# Patient Record
Sex: FEMALE | Race: WHITE | HISPANIC OR LATINO
[De-identification: names, ages, dates, MRNs, and addresses within clinical notes are randomized per-mention and may not be internally consistent; named-entity substitution may affect disease eponyms.]

---

## 2020-10-01 ENCOUNTER — APPOINTMENT (OUTPATIENT)
Dept: OBGYN | Facility: CLINIC | Age: 30
End: 2020-10-01
Payer: COMMERCIAL

## 2020-10-01 VITALS
HEIGHT: 62.5 IN | TEMPERATURE: 98.5 F | BODY MASS INDEX: 24.94 KG/M2 | WEIGHT: 139 LBS | DIASTOLIC BLOOD PRESSURE: 88 MMHG | SYSTOLIC BLOOD PRESSURE: 123 MMHG

## 2020-10-01 DIAGNOSIS — Z31.62 ENCOUNTER FOR FERTILITY PRESERVATION COUNSELING: ICD-10-CM

## 2020-10-01 DIAGNOSIS — D22.9 MELANOCYTIC NEVI, UNSPECIFIED: ICD-10-CM

## 2020-10-01 DIAGNOSIS — Z78.9 OTHER SPECIFIED HEALTH STATUS: ICD-10-CM

## 2020-10-01 DIAGNOSIS — Z11.3 ENCOUNTER FOR SCREENING FOR INFECTIONS WITH A PREDOMINANTLY SEXUAL MODE OF TRANSMISSION: ICD-10-CM

## 2020-10-01 PROCEDURE — 99203 OFFICE O/P NEW LOW 30 MIN: CPT

## 2020-10-01 NOTE — REVIEW OF SYSTEMS
[Fever] : no fever [Chills] : no chills [Fatigue] : no fatigue [Poor Appetite] : appetite not poor [Night Sweats] : no night sweats [Recent Weight Gain (___ Lbs)] : no recent weight gain [Recent Weight Loss (___ Lbs)] : no recent weight loss [Dry Eyes] : no dry eyes [Eye Discharge] : no eye discharge [Sight Problems] : no sight problems [Eye Redness] : no eye redness [Dec Hearing] : no decreased hearing [Nasal Discharge] : no nasal discharge [Sore Throat] : no sore throat [Dyspnea] : no dyspnea [Cough] : no cough [SOB on Exertion] : no shortness of breath on exertion [Wheezing] : no wheezing [Chest Pain] : no chest pain [Palpitations] : no palpitations [Lower Ext Edema] : no lower extremity edema [Orthopnea] : no orthopnea [Abdominal Pain] : no abdominal pain [Constipation] : no constipation [Diarrhea] : diarrhea [Heartburn] : no heartburn [Vomiting] : no vomiting [Melena] : no melena [Bloating] : no bloating [Nausea] : no nausea [Urgency] : no urgency [Frequency] : no frequency [Incontinence] : no incontinence [Dysuria] : no dysuria [Urethral Discharge] : no urethral discharge [Abn Vaginal bleeding] : no abnormal vaginal bleeding [Pelvic pain] : no pelvic pain [CVA Pain] : no CVA pain [Genital Rash/Irritation] : no genital rash/irritation [Breast Pain] : no breast pain [Breast Lump] : no breast lump [Nipple Changes] : no nipple changes [Nipple Discharge] : no nipple discharge [Skin Lesion on Breast] : no skin lesion on breast [Skin Rash] : no skin rash [Mole Changes] : no mole changes [Arthralgias] : no arthralgias [Myalgias] : no myalgias [Joint Stiffness] : no joint stiffness [Back Pain] : no back pain [Headache] : no headache [Dizziness] : no dizziness [Fainting] : no fainting [Convulsions] : no convulsions [Anxiety] : no anxiety [Depression] : no depression [Sleep Disturbances] : no sleep disturbances [Decreased Libido] : libido not decreased [PMS/PMDD Symptoms] : no PMS/PMDD symptoms [Deepening Voice] : no deepening voice [Feeling Weak] : not feeling weak [Hot Flashes] : no hot flashes [Muscle Weakness] : no muscle weakness [Easy Bleeding] : no easy bleeding [Easy Bruising] : no easy bruising [Nose Bleeds] : no nose bleeds

## 2020-10-01 NOTE — HISTORY OF PRESENT ILLNESS
[FreeTextEntry1] : 30yo G0 LMP "week of 9/14//20" presents for annual exam\par Has no complaints\par Interested in egg freezing\par In good spirits\par Was walking during pandemic\par Starting a kick boxing boot camp this week\par

## 2020-10-06 LAB
C TRACH RRNA SPEC QL NAA+PROBE: NOT DETECTED
HBV SURFACE AG SER QL: NONREACTIVE
HIV1+2 AB SPEC QL IA.RAPID: NONREACTIVE
HPV HIGH+LOW RISK DNA PNL CVX: NOT DETECTED
N GONORRHOEA RRNA SPEC QL NAA+PROBE: NOT DETECTED
SOURCE AMPLIFICATION: NORMAL
T PALLIDUM AB SER QL IA: NEGATIVE

## 2020-10-07 LAB — CYTOLOGY CVX/VAG DOC THIN PREP: ABNORMAL

## 2020-11-03 ENCOUNTER — APPOINTMENT (OUTPATIENT)
Dept: DERMATOLOGY | Facility: CLINIC | Age: 30
End: 2020-11-03
Payer: COMMERCIAL

## 2020-11-03 VITALS — BODY MASS INDEX: 24.94 KG/M2 | WEIGHT: 139 LBS | HEIGHT: 62.5 IN

## 2020-11-03 DIAGNOSIS — Z13.89 ENCOUNTER FOR SCREENING FOR OTHER DISORDER: ICD-10-CM

## 2020-11-03 DIAGNOSIS — L81.4 OTHER MELANIN HYPERPIGMENTATION: ICD-10-CM

## 2020-11-03 DIAGNOSIS — D22.9 MELANOCYTIC NEVI, UNSPECIFIED: ICD-10-CM

## 2020-11-03 PROCEDURE — 99072 ADDL SUPL MATRL&STAF TM PHE: CPT

## 2020-11-03 PROCEDURE — 99243 OFF/OP CNSLTJ NEW/EST LOW 30: CPT

## 2020-11-03 NOTE — HISTORY OF PRESENT ILLNESS
[FreeTextEntry1] : skin check [de-identified] : 29 year old female here for skin check. no tx tried. was recommended to get skin check.

## 2020-11-06 RX ORDER — NORETHINDRONE ACETATE AND ETHINYL ESTRADIOL, ETHINYL ESTRADIOL AND FERROUS FUMARATE 1MG-10(24)
1 MG-10 MCG / KIT ORAL
Qty: 3 | Refills: 3 | Status: ACTIVE | COMMUNITY
Start: 1900-01-01 | End: 1900-01-01

## 2020-11-10 ENCOUNTER — APPOINTMENT (OUTPATIENT)
Dept: HUMAN REPRODUCTION | Facility: CLINIC | Age: 30
End: 2020-11-10

## 2020-12-01 ENCOUNTER — APPOINTMENT (OUTPATIENT)
Dept: DERMATOLOGY | Facility: CLINIC | Age: 30
End: 2020-12-01

## 2021-07-13 ENCOUNTER — TRANSCRIPTION ENCOUNTER (OUTPATIENT)
Age: 31
End: 2021-07-13

## 2021-08-12 ENCOUNTER — OUTPATIENT (OUTPATIENT)
Dept: OUTPATIENT SERVICES | Facility: HOSPITAL | Age: 31
LOS: 1 days | End: 2021-08-12
Payer: COMMERCIAL

## 2021-08-12 ENCOUNTER — TRANSCRIPTION ENCOUNTER (OUTPATIENT)
Age: 31
End: 2021-08-12

## 2021-08-12 ENCOUNTER — APPOINTMENT (OUTPATIENT)
Dept: RADIOLOGY | Facility: CLINIC | Age: 31
End: 2021-08-12
Payer: COMMERCIAL

## 2021-08-12 DIAGNOSIS — J45.21 MILD INTERMITTENT ASTHMA WITH (ACUTE) EXACERBATION: ICD-10-CM

## 2021-08-12 PROCEDURE — 71046 X-RAY EXAM CHEST 2 VIEWS: CPT

## 2021-08-12 PROCEDURE — 71046 X-RAY EXAM CHEST 2 VIEWS: CPT | Mod: 26

## 2021-12-08 NOTE — CONSULT LETTER
[Dear  ___] : Dear  [unfilled], [Consult Letter:] : I had the pleasure of evaluating your patient, [unfilled]. [Please see my note below.] : Please see my note below. [Consult Closing:] : Thank you very much for allowing me to participate in the care of this patient.  If you have any questions, please do not hesitate to contact me. [Sincerely,] : Sincerely, [FreeTextEntry3] : Best,\par \par Arturo Hood MD, FAAD\par Department of Dermatology [facility]\par \par  Hydroxyzine Counseling: Patient advised that the medication is sedating and not to drive a car after taking this medication.  Patient informed of potential adverse effects including but not limited to dry mouth, urinary retention, and blurry vision.  The patient verbalized understanding of the proper use and possible adverse effects of hydroxyzine.  All of the patient's questions and concerns were addressed.

## 2022-08-31 ENCOUNTER — NON-APPOINTMENT (OUTPATIENT)
Age: 32
End: 2022-08-31

## 2022-09-24 ENCOUNTER — NON-APPOINTMENT (OUTPATIENT)
Age: 32
End: 2022-09-24

## 2023-01-07 ENCOUNTER — INPATIENT (INPATIENT)
Facility: HOSPITAL | Age: 33
LOS: 1 days | Discharge: HOME | DRG: 897 | End: 2023-01-09
Attending: INTERNAL MEDICINE | Admitting: INTERNAL MEDICINE
Payer: MEDICAID

## 2023-01-07 VITALS
WEIGHT: 126.1 LBS | HEART RATE: 105 BPM | DIASTOLIC BLOOD PRESSURE: 91 MMHG | OXYGEN SATURATION: 95 % | TEMPERATURE: 98 F | HEIGHT: 62 IN | SYSTOLIC BLOOD PRESSURE: 153 MMHG | RESPIRATION RATE: 20 BRPM

## 2023-01-07 DIAGNOSIS — F10.239 ALCOHOL DEPENDENCE WITH WITHDRAWAL, UNSPECIFIED: ICD-10-CM

## 2023-01-07 LAB
ALBUMIN SERPL ELPH-MCNC: 5.2 G/DL — HIGH (ref 3.3–5)
ALP SERPL-CCNC: 112 U/L — SIGNIFICANT CHANGE UP (ref 40–120)
ALT FLD-CCNC: 55 U/L — HIGH (ref 10–45)
AMPHET UR-MCNC: NEGATIVE — SIGNIFICANT CHANGE UP
ANION GAP SERPL CALC-SCNC: 22 MMOL/L — HIGH (ref 5–17)
APAP SERPL-MCNC: <15 UG/ML — SIGNIFICANT CHANGE UP (ref 10–30)
APPEARANCE UR: CLEAR — SIGNIFICANT CHANGE UP
AST SERPL-CCNC: 47 U/L — HIGH (ref 10–40)
BACTERIA # UR AUTO: NEGATIVE — SIGNIFICANT CHANGE UP
BARBITURATES UR SCN-MCNC: NEGATIVE — SIGNIFICANT CHANGE UP
BASOPHILS # BLD AUTO: 0.01 K/UL — SIGNIFICANT CHANGE UP (ref 0–0.2)
BASOPHILS NFR BLD AUTO: 0.2 % — SIGNIFICANT CHANGE UP (ref 0–2)
BENZODIAZ UR-MCNC: NEGATIVE — SIGNIFICANT CHANGE UP
BILIRUB SERPL-MCNC: 1 MG/DL — SIGNIFICANT CHANGE UP (ref 0.2–1.2)
BILIRUB UR-MCNC: NEGATIVE — SIGNIFICANT CHANGE UP
BUN SERPL-MCNC: 20 MG/DL — SIGNIFICANT CHANGE UP (ref 7–23)
CALCIUM SERPL-MCNC: 10.4 MG/DL — SIGNIFICANT CHANGE UP (ref 8.4–10.5)
CHLORIDE SERPL-SCNC: 85 MMOL/L — LOW (ref 96–108)
CO2 SERPL-SCNC: 25 MMOL/L — SIGNIFICANT CHANGE UP (ref 22–31)
COCAINE METAB.OTHER UR-MCNC: NEGATIVE — SIGNIFICANT CHANGE UP
COLOR SPEC: SIGNIFICANT CHANGE UP
CREAT SERPL-MCNC: 1.2 MG/DL — SIGNIFICANT CHANGE UP (ref 0.5–1.3)
DIFF PNL FLD: ABNORMAL
EGFR: 61 ML/MIN/1.73M2 — SIGNIFICANT CHANGE UP
EOSINOPHIL # BLD AUTO: 0 K/UL — SIGNIFICANT CHANGE UP (ref 0–0.5)
EOSINOPHIL NFR BLD AUTO: 0 % — SIGNIFICANT CHANGE UP (ref 0–6)
EPI CELLS # UR: 2 /HPF — SIGNIFICANT CHANGE UP
ETHANOL SERPL-MCNC: <10 MG/DL — SIGNIFICANT CHANGE UP (ref 0–10)
GLUCOSE SERPL-MCNC: 203 MG/DL — HIGH (ref 70–99)
GLUCOSE UR QL: ABNORMAL
HCG SERPL-ACNC: <2 MIU/ML — SIGNIFICANT CHANGE UP
HCT VFR BLD CALC: 38.1 % — SIGNIFICANT CHANGE UP (ref 34.5–45)
HGB BLD-MCNC: 13.8 G/DL — SIGNIFICANT CHANGE UP (ref 11.5–15.5)
HYALINE CASTS # UR AUTO: 2 /LPF — SIGNIFICANT CHANGE UP (ref 0–2)
IMM GRANULOCYTES NFR BLD AUTO: 0.5 % — SIGNIFICANT CHANGE UP (ref 0–0.9)
KETONES UR-MCNC: ABNORMAL
LEUKOCYTE ESTERASE UR-ACNC: NEGATIVE — SIGNIFICANT CHANGE UP
LYMPHOCYTES # BLD AUTO: 0.52 K/UL — LOW (ref 1–3.3)
LYMPHOCYTES # BLD AUTO: 8.5 % — LOW (ref 13–44)
MCHC RBC-ENTMCNC: 32.3 PG — SIGNIFICANT CHANGE UP (ref 27–34)
MCHC RBC-ENTMCNC: 36.2 GM/DL — HIGH (ref 32–36)
MCV RBC AUTO: 89.2 FL — SIGNIFICANT CHANGE UP (ref 80–100)
METHADONE UR-MCNC: NEGATIVE — SIGNIFICANT CHANGE UP
MONOCYTES # BLD AUTO: 0.41 K/UL — SIGNIFICANT CHANGE UP (ref 0–0.9)
MONOCYTES NFR BLD AUTO: 6.7 % — SIGNIFICANT CHANGE UP (ref 2–14)
NEUTROPHILS # BLD AUTO: 5.16 K/UL — SIGNIFICANT CHANGE UP (ref 1.8–7.4)
NEUTROPHILS NFR BLD AUTO: 84.1 % — HIGH (ref 43–77)
NITRITE UR-MCNC: NEGATIVE — SIGNIFICANT CHANGE UP
NRBC # BLD: 0 /100 WBCS — SIGNIFICANT CHANGE UP (ref 0–0)
OPIATES UR-MCNC: NEGATIVE — SIGNIFICANT CHANGE UP
OXYCODONE UR-MCNC: NEGATIVE — SIGNIFICANT CHANGE UP
PCP SPEC-MCNC: SIGNIFICANT CHANGE UP
PCP UR-MCNC: NEGATIVE — SIGNIFICANT CHANGE UP
PH UR: 5.5 — SIGNIFICANT CHANGE UP (ref 5–8)
PLATELET # BLD AUTO: 73 K/UL — LOW (ref 150–400)
POTASSIUM SERPL-MCNC: 2.7 MMOL/L — CRITICAL LOW (ref 3.5–5.3)
POTASSIUM SERPL-SCNC: 2.7 MMOL/L — CRITICAL LOW (ref 3.5–5.3)
PROT SERPL-MCNC: 8.9 G/DL — HIGH (ref 6–8.3)
PROT UR-MCNC: ABNORMAL
RBC # BLD: 4.27 M/UL — SIGNIFICANT CHANGE UP (ref 3.8–5.2)
RBC # FLD: 11.4 % — SIGNIFICANT CHANGE UP (ref 10.3–14.5)
RBC CASTS # UR COMP ASSIST: 70 /HPF — HIGH (ref 0–4)
SALICYLATES SERPL-MCNC: <2 MG/DL — LOW (ref 15–30)
SARS-COV-2 RNA SPEC QL NAA+PROBE: SIGNIFICANT CHANGE UP
SODIUM SERPL-SCNC: 132 MMOL/L — LOW (ref 135–145)
SP GR SPEC: 1.01 — LOW (ref 1.01–1.02)
THC UR QL: NEGATIVE — SIGNIFICANT CHANGE UP
UROBILINOGEN FLD QL: NEGATIVE — SIGNIFICANT CHANGE UP
WBC # BLD: 6.13 K/UL — SIGNIFICANT CHANGE UP (ref 3.8–10.5)
WBC # FLD AUTO: 6.13 K/UL — SIGNIFICANT CHANGE UP (ref 3.8–10.5)
WBC UR QL: 2 /HPF — SIGNIFICANT CHANGE UP (ref 0–5)

## 2023-01-07 PROCEDURE — 99285 EMERGENCY DEPT VISIT HI MDM: CPT

## 2023-01-07 RX ORDER — POTASSIUM CHLORIDE 20 MEQ
40 PACKET (EA) ORAL ONCE
Refills: 0 | Status: COMPLETED | OUTPATIENT
Start: 2023-01-07 | End: 2023-01-07

## 2023-01-07 RX ORDER — SODIUM CHLORIDE 9 MG/ML
1000 INJECTION INTRAMUSCULAR; INTRAVENOUS; SUBCUTANEOUS
Refills: 0 | Status: DISCONTINUED | OUTPATIENT
Start: 2023-01-07 | End: 2023-01-09

## 2023-01-07 RX ORDER — POTASSIUM CHLORIDE 20 MEQ
10 PACKET (EA) ORAL
Refills: 0 | Status: COMPLETED | OUTPATIENT
Start: 2023-01-07 | End: 2023-01-07

## 2023-01-07 RX ORDER — POTASSIUM CHLORIDE 20 MEQ
40 PACKET (EA) ORAL ONCE
Refills: 0 | Status: DISCONTINUED | OUTPATIENT
Start: 2023-01-07 | End: 2023-01-07

## 2023-01-07 RX ADMIN — Medication 1 MILLIGRAM(S): at 19:45

## 2023-01-07 RX ADMIN — Medication 50 MILLIGRAM(S): at 19:12

## 2023-01-07 RX ADMIN — SODIUM CHLORIDE 100 MILLILITER(S): 9 INJECTION INTRAMUSCULAR; INTRAVENOUS; SUBCUTANEOUS at 20:39

## 2023-01-07 RX ADMIN — Medication 40 MILLIEQUIVALENT(S): at 20:14

## 2023-01-07 RX ADMIN — Medication 100 MILLIEQUIVALENT(S): at 21:47

## 2023-01-07 RX ADMIN — Medication 100 MILLIEQUIVALENT(S): at 20:13

## 2023-01-07 NOTE — ED PROVIDER NOTE - OBJECTIVE STATEMENT
Patient is a 32-year-old female with a past medical history of daily alcohol abuse prior cocaine use anxiety presents to the ER today for anxiety.  Accompanied by cousin at bedside who states that patient was claiming that her neighbors were reporting her.  Patient has also been disclosing to family she that she is hearing voices.  Has not had any alcoholic beverages in the past 4 days.  No history of alcohol withdrawal seizures NICU admissions or intubations in the past.  Denies SI HI.  No prior psychiatric hospitalizations.  Denies any active drug use.

## 2023-01-07 NOTE — H&P ADULT - NSHPLABSRESULTS_GEN_ALL_CORE
13.8   6.13  )-----------( 73       ( 07 Jan 2023 18:44 )             38.1   01-07    132<L>  |  85<L>  |  20  ----------------------------<  203<H>  2.7<LL>   |  25  |  1.20    Ca    10.4      07 Jan 2023 18:44    TPro  8.9<H>  /  Alb  5.2<H>  /  TBili  1.0  /  DBili  x   /  AST  47<H>  /  ALT  55<H>  /  AlkPhos  112  01-07

## 2023-01-07 NOTE — ED PROVIDER NOTE - PHYSICAL EXAMINATION
General: Patient awake alert NAD. Tongue fasciculations and BL hand tremulous   HEENT: normocephalic, atraumatic, EOMI, MMM   Cardiac: RRR, S1, S2, no murmur.   LUNGS: + crackles in b/l bases, no wheeze, rhonchi, speaking full sentences.     Abdomen: soft NT, ND, no rebound no guarding.   EXT: Moving all extremities, no edema.   Neuro: A&Ox3, no focal neurological deficits, CN 2-12 grossly intact.   Skin: warm, dry, no rash.  Psych: anxious

## 2023-01-07 NOTE — ED ADULT NURSE NOTE - OBJECTIVE STATEMENT
32 year old female BIB cousin for psych eval; A&O; denies ABDULKADIR; denies current AVH "I heard voices last night"; denies drug use, states stopped drinking alcohol four days ago; denies Axis III. This is an anxious appearing but cooperative pt, compliant with wanding and property taken, CO begun. Pt was accompanied by her cousin who states that pt lives with her parents and has been hearing things, as per triage pt tried to elope prior to coming back; pt states that she has had no prior psych HX and does not take any meds, she states that she is very anxious and has been "very stressed because I am unemployed and trying to find a job" and consequently she has been drinking heavily "Continuously since early December until January 3rd" and while she denies any seizure history states she has been very tremulous; she responded well to empathetic support; continue to monitor.

## 2023-01-07 NOTE — H&P ADULT - ASSESSMENT
A/P     Hypokalemia :  replace K     ETOH intoxication / AMS/ metabolic encephalopathy/ Hallucination   -likely 2/2 ETOH abuse   watch for DT's  started on CIWA protocol     psych consult in am  A/P     Hypokalemia :  replace K     ETOH withdrawal / Hallucination / anxiety d/o  -likely 2/2 ETOH abuse   watch for DT's  started on CIWA protocol     psych consult in am

## 2023-01-07 NOTE — ED PROVIDER NOTE - PROGRESS NOTE DETAILS
Kranthi, PGY-3  Patient signed out to me. 32-year-old female with a past medical history of daily alcohol abuse prior cocaine use anxiety presents to the ER today for anxiety/auditory hallucinations with last drink on Tuesday. Reassessed patient who is tremulous, anxious, tongue fasciculations, CIWA 8-10. Given 50mg PO librium prior to signout. Will give an additional 1mg IV ativan and monitor on CIWA. Spoke with patient who describes less hallucination and more so harsh sounds/ringing/and sensitivity sound; more suspicious for withdrawal symptoms than psychiatric. Will require medicine admission and can see psych inpatient if still displaying symptoms. Kranthi, PGY-3  Hypokalemic to 2.7. PO/IV repletion ordered. Admitted to Dr. Mejia

## 2023-01-07 NOTE — ED PROVIDER NOTE - CLINICAL SUMMARY MEDICAL DECISION MAKING FREE TEXT BOX
Patient is a 32-year-old female with a past medical history of daily alcohol abuse prior cocaine use anxiety presents to the ER today for anxiety.  Differential includes anxiety vs PRADIP vs thyroid dysfunx. Plan to obtain labs, ecg, psych c/s, reassess

## 2023-01-07 NOTE — ED ADULT NURSE REASSESSMENT NOTE - NS ED NURSE REASSESS COMMENT FT1
Pt belongings were secured. As per MD Crisostomo, pt is not psych and is being teated for alcohol withdrawal. Pt phone was not secured with belongings; MD Crisostomo is aware and okay with patient having cell phone.

## 2023-01-07 NOTE — H&P ADULT - NSHPPHYSICALEXAM_GEN_ALL_CORE
Vital Signs Last 24 Hrs  T(C): 36.9 (07 Jan 2023 17:44), Max: 36.9 (07 Jan 2023 17:44)  T(F): 98.4 (07 Jan 2023 17:44), Max: 98.4 (07 Jan 2023 17:44)  HR: 105 (07 Jan 2023 17:44) (105 - 105)  BP: 153/91 (07 Jan 2023 17:44) (153/91 - 153/91)  BP(mean): --  RR: 20 (07 Jan 2023 17:44) (20 - 20)  SpO2: 95% (07 Jan 2023 17:44) (95% - 95%)    Parameters below as of 07 Jan 2023 17:44  Patient On (Oxygen Delivery Method): room air pt. seen and examined, mother bedside     Vital Signs Last 24 Hrs  T(C): 36.9 (07 Jan 2023 17:44), Max: 36.9 (07 Jan 2023 17:44)  T(F): 98.4 (07 Jan 2023 17:44), Max: 98.4 (07 Jan 2023 17:44)  HR: 105 (07 Jan 2023 17:44) (105 - 105)  BP: 153/91 (07 Jan 2023 17:44) (153/91 - 153/91)  BP(mean): --  RR: 20 (07 Jan 2023 17:44) (20 - 20)  SpO2: 95% (07 Jan 2023 17:44) (95% - 95%)    Heent : nc/at , no pallor   neck : supple, no JVD  Lungs : B/L clear , no w/r/r  heart: s1s2 nml  abd : soft, NABS, NT/ND  ext : no e/c/c, pulses 2 +  neuro: aaox3 , anxious     Parameters below as of 07 Jan 2023 17:44  Patient On (Oxygen Delivery Method): room air

## 2023-01-07 NOTE — H&P ADULT - BIRTH SEX
Pt requested a call back re pt stated he spoke with [facility] and they are saying they did not receive the Rx for Tradjenta 5 MG tablet on 11/4/21. Pt is requesting it be resent and would like a call back when this has been done as he has been with out medication. Pt can be reached at   Telephone Information:   Mobile [phone]          Female

## 2023-01-07 NOTE — ED PROVIDER NOTE - ATTENDING CONTRIBUTION TO CARE
Patient is a 31 yo F with a history of alcohol abuse here, prior cocaine use brought in for evaluation of anxiety and paranoia. I interviewed patient but additional history was provided to Dr. Greenberg and Dr. Crisostomo. Per patient, she drinks about 1 bottle of wine daily, stopped drinking on Tuesday (5 days ago). Last drink was on Tuesday. She reports headache and vomiting on Wednesday, followed by hearing sounds from Thursday onwards. She states she thought her parents were fighting but only she was hearing things. She describes her neighbors taping her and an intervention done by her family and posting it on the internet. She states the wifi was turned off but they could still tape and hear everything. Patient denies any drug use to me. She denies prior psych history. Mother at bedside denies any prior psych history. Patient states she felt she was having a panic attack because her neighbors were listing and taping things and posting it online. She denies any chest pain, abdominal pain, fevers. She states she has not been sleeping, only getting about 1 hour of sleep at night and has racing thoughts. Denies any suicidal ideations.     VS noted  Gen. no acute distress, Non toxic   HEENT: EOMI, mmm, tongue fasciculations  Lungs: CTAB/L no C/ W /R   CVS: tachycardic  Abd; Soft non tender, non distended   Ext: no edema  Skin: no rash  Neuro AAOx3 non focal clear speech  a/p: ETOH withdrawal, concern for psychosis. Plan to treat ETOH withdrawal with librium. cbc, cmp, salicylate, acetaminophen and alcohol levels were done. Of note, K was found to be 2.7. Patient is not medically cleared. She is having ETOH withdrawal symptoms and is hypokalemic. Plan to treat based on CIWA score and treat hypokalemia. EKG ordered. Patient will need psych assessment after she is medically treated. TBA to tele medicine.   - Taina KERR

## 2023-01-08 LAB
ALBUMIN SERPL ELPH-MCNC: 4.3 G/DL — SIGNIFICANT CHANGE UP (ref 3.3–5)
ALP SERPL-CCNC: 81 U/L — SIGNIFICANT CHANGE UP (ref 40–120)
ALT FLD-CCNC: 46 U/L — HIGH (ref 10–45)
ANION GAP SERPL CALC-SCNC: 12 MMOL/L — SIGNIFICANT CHANGE UP (ref 5–17)
ANION GAP SERPL CALC-SCNC: 12 MMOL/L — SIGNIFICANT CHANGE UP (ref 5–17)
ANION GAP SERPL CALC-SCNC: 14 MMOL/L — SIGNIFICANT CHANGE UP (ref 5–17)
AST SERPL-CCNC: 48 U/L — HIGH (ref 10–40)
BILIRUB SERPL-MCNC: 0.9 MG/DL — SIGNIFICANT CHANGE UP (ref 0.2–1.2)
BUN SERPL-MCNC: 17 MG/DL — SIGNIFICANT CHANGE UP (ref 7–23)
BUN SERPL-MCNC: 19 MG/DL — SIGNIFICANT CHANGE UP (ref 7–23)
BUN SERPL-MCNC: 21 MG/DL — SIGNIFICANT CHANGE UP (ref 7–23)
CALCIUM SERPL-MCNC: 9.1 MG/DL — SIGNIFICANT CHANGE UP (ref 8.4–10.5)
CALCIUM SERPL-MCNC: 9.5 MG/DL — SIGNIFICANT CHANGE UP (ref 8.4–10.5)
CALCIUM SERPL-MCNC: 9.5 MG/DL — SIGNIFICANT CHANGE UP (ref 8.4–10.5)
CHLORIDE SERPL-SCNC: 104 MMOL/L — SIGNIFICANT CHANGE UP (ref 96–108)
CHLORIDE SERPL-SCNC: 105 MMOL/L — SIGNIFICANT CHANGE UP (ref 96–108)
CHLORIDE SERPL-SCNC: 98 MMOL/L — SIGNIFICANT CHANGE UP (ref 96–108)
CO2 SERPL-SCNC: 21 MMOL/L — LOW (ref 22–31)
CO2 SERPL-SCNC: 22 MMOL/L — SIGNIFICANT CHANGE UP (ref 22–31)
CO2 SERPL-SCNC: 26 MMOL/L — SIGNIFICANT CHANGE UP (ref 22–31)
CREAT SERPL-MCNC: 0.9 MG/DL — SIGNIFICANT CHANGE UP (ref 0.5–1.3)
CREAT SERPL-MCNC: 0.94 MG/DL — SIGNIFICANT CHANGE UP (ref 0.5–1.3)
CREAT SERPL-MCNC: 1.02 MG/DL — SIGNIFICANT CHANGE UP (ref 0.5–1.3)
EGFR: 74 ML/MIN/1.73M2 — SIGNIFICANT CHANGE UP
EGFR: 82 ML/MIN/1.73M2 — SIGNIFICANT CHANGE UP
EGFR: 87 ML/MIN/1.73M2 — SIGNIFICANT CHANGE UP
GLUCOSE SERPL-MCNC: 168 MG/DL — HIGH (ref 70–99)
GLUCOSE SERPL-MCNC: 176 MG/DL — HIGH (ref 70–99)
GLUCOSE SERPL-MCNC: 213 MG/DL — HIGH (ref 70–99)
HCT VFR BLD CALC: 32.7 % — LOW (ref 34.5–45)
HGB BLD-MCNC: 11.2 G/DL — LOW (ref 11.5–15.5)
MCHC RBC-ENTMCNC: 31.8 PG — SIGNIFICANT CHANGE UP (ref 27–34)
MCHC RBC-ENTMCNC: 34.3 GM/DL — SIGNIFICANT CHANGE UP (ref 32–36)
MCV RBC AUTO: 92.9 FL — SIGNIFICANT CHANGE UP (ref 80–100)
NRBC # BLD: 0 /100 WBCS — SIGNIFICANT CHANGE UP (ref 0–0)
PLATELET # BLD AUTO: 53 K/UL — LOW (ref 150–400)
POTASSIUM SERPL-MCNC: 2.9 MMOL/L — CRITICAL LOW (ref 3.5–5.3)
POTASSIUM SERPL-MCNC: 4.2 MMOL/L — SIGNIFICANT CHANGE UP (ref 3.5–5.3)
POTASSIUM SERPL-MCNC: 4.4 MMOL/L — SIGNIFICANT CHANGE UP (ref 3.5–5.3)
POTASSIUM SERPL-SCNC: 2.9 MMOL/L — CRITICAL LOW (ref 3.5–5.3)
POTASSIUM SERPL-SCNC: 4.2 MMOL/L — SIGNIFICANT CHANGE UP (ref 3.5–5.3)
POTASSIUM SERPL-SCNC: 4.4 MMOL/L — SIGNIFICANT CHANGE UP (ref 3.5–5.3)
PROT SERPL-MCNC: 6.8 G/DL — SIGNIFICANT CHANGE UP (ref 6–8.3)
RBC # BLD: 3.52 M/UL — LOW (ref 3.8–5.2)
RBC # FLD: 11.6 % — SIGNIFICANT CHANGE UP (ref 10.3–14.5)
SODIUM SERPL-SCNC: 138 MMOL/L — SIGNIFICANT CHANGE UP (ref 135–145)
TSH SERPL-MCNC: 1.36 UIU/ML — SIGNIFICANT CHANGE UP (ref 0.27–4.2)
WBC # BLD: 3.6 K/UL — LOW (ref 3.8–10.5)
WBC # FLD AUTO: 3.6 K/UL — LOW (ref 3.8–10.5)

## 2023-01-08 RX ORDER — POTASSIUM CHLORIDE 20 MEQ
20 PACKET (EA) ORAL
Refills: 0 | Status: COMPLETED | OUTPATIENT
Start: 2023-01-08 | End: 2023-01-08

## 2023-01-08 RX ORDER — THIAMINE MONONITRATE (VIT B1) 100 MG
100 TABLET ORAL DAILY
Refills: 0 | Status: DISCONTINUED | OUTPATIENT
Start: 2023-01-08 | End: 2023-01-09

## 2023-01-08 RX ORDER — POTASSIUM CHLORIDE 20 MEQ
40 PACKET (EA) ORAL
Refills: 0 | Status: DISCONTINUED | OUTPATIENT
Start: 2023-01-08 | End: 2023-01-09

## 2023-01-08 RX ORDER — PANTOPRAZOLE SODIUM 20 MG/1
40 TABLET, DELAYED RELEASE ORAL
Refills: 0 | Status: DISCONTINUED | OUTPATIENT
Start: 2023-01-08 | End: 2023-01-09

## 2023-01-08 RX ORDER — FOLIC ACID 0.8 MG
1 TABLET ORAL DAILY
Refills: 0 | Status: DISCONTINUED | OUTPATIENT
Start: 2023-01-08 | End: 2023-01-09

## 2023-01-08 RX ORDER — POTASSIUM CHLORIDE 20 MEQ
10 PACKET (EA) ORAL
Refills: 0 | Status: COMPLETED | OUTPATIENT
Start: 2023-01-08 | End: 2023-01-08

## 2023-01-08 RX ADMIN — SODIUM CHLORIDE 100 MILLILITER(S): 9 INJECTION INTRAMUSCULAR; INTRAVENOUS; SUBCUTANEOUS at 14:11

## 2023-01-08 RX ADMIN — Medication 100 MILLIEQUIVALENT(S): at 01:11

## 2023-01-08 RX ADMIN — SODIUM CHLORIDE 100 MILLILITER(S): 9 INJECTION INTRAMUSCULAR; INTRAVENOUS; SUBCUTANEOUS at 09:14

## 2023-01-08 RX ADMIN — Medication 100 MILLIEQUIVALENT(S): at 10:55

## 2023-01-08 RX ADMIN — Medication 100 MILLIEQUIVALENT(S): at 11:56

## 2023-01-08 RX ADMIN — Medication 20 MILLIEQUIVALENT(S): at 12:02

## 2023-01-08 RX ADMIN — SODIUM CHLORIDE 100 MILLILITER(S): 9 INJECTION INTRAMUSCULAR; INTRAVENOUS; SUBCUTANEOUS at 11:56

## 2023-01-08 RX ADMIN — Medication 1 MILLIGRAM(S): at 12:02

## 2023-01-08 RX ADMIN — Medication 100 MILLIGRAM(S): at 12:02

## 2023-01-08 RX ADMIN — Medication 100 MILLIEQUIVALENT(S): at 14:11

## 2023-01-08 RX ADMIN — Medication 20 MILLIEQUIVALENT(S): at 10:55

## 2023-01-08 RX ADMIN — SODIUM CHLORIDE 100 MILLILITER(S): 9 INJECTION INTRAMUSCULAR; INTRAVENOUS; SUBCUTANEOUS at 10:55

## 2023-01-08 NOTE — ED ADULT NURSE REASSESSMENT NOTE - NS ED NURSE REASSESS COMMENT FT1
spoke with NP Mychal - made aware pt requesting to speak with provider, requesting potassium elixir, 1:1 present, pt without c/o, mother present

## 2023-01-08 NOTE — PROGRESS NOTE ADULT - SUBJECTIVE AND OBJECTIVE BOX
Patient is a 32y old  Female who presents with a chief complaint of ETOH withdrawal / anxiety d/o (2023 20:16)      INTERVAL HPI/OVERNIGHT EVENTS: seen and examined , feeling ok   T(C): 36.7 (23 @ 02:15), Max: 37.2 (23 @ 21:00)  HR: 80 (23 @ 02:15) (65 - 90)  BP: 147/91 (23 @ 02:15) (100/63 - 147/91)  RR: 18 (23 @ 02:15) (16 - 20)  SpO2: 99% (23 @ 02:15) (98% - 99%)  Wt(kg): --  I&O's Summary      PAST MEDICAL & SURGICAL HISTORY:  No pertinent past medical history      No significant past surgical history          SOCIAL HISTORY  Alcohol:  Tobacco:  Illicit substance use:    FAMILY HISTORY:    REVIEW OF SYSTEMS:  CONSTITUTIONAL: No fever, weight loss, or fatigue  EYES: No eye pain, visual disturbances, or discharge  ENMT:  No difficulty hearing, tinnitus, vertigo; No sinus or throat pain  NECK: No pain or stiffness  RESPIRATORY: No cough, wheezing, chills or hemoptysis; No shortness of breath  CARDIOVASCULAR: No chest pain, palpitations, dizziness, or leg swelling  GASTROINTESTINAL: No abdominal or epigastric pain. No nausea, vomiting, or hematemesis; No diarrhea or constipation. No melena or hematochezia.  GENITOURINARY: No dysuria, frequency, hematuria, or incontinence  NEUROLOGICAL: No headaches, memory loss, loss of strength, numbness, or tremors  SKIN: No itching, burning, rashes, or lesions   LYMPH NODES: No enlarged glands  ENDOCRINE: No heat or cold intolerance; No hair loss  MUSCULOSKELETAL: No joint pain or swelling; No muscle, back, or extremity pain  PSYCHIATRIC: No depression, anxiety, mood swings, or difficulty sleeping  HEME/LYMPH: No easy bruising, or bleeding gums  ALLERY AND IMMUNOLOGIC: No hives or eczema    RADIOLOGY & ADDITIONAL TESTS:    Imaging Personally Reviewed:  [ ] YES  [ ] NO    Consultant(s) Notes Reviewed:  [ ] YES  [ ] NO    PHYSICAL EXAM:  GENERAL: NAD, well-groomed, well-developed  HEAD:  Atraumatic, Normocephalic  EYES: EOMI, PERRLA, conjunctiva and sclera clear  ENMT: No tonsillar erythema, exudates, or enlargement; Moist mucous membranes, Good dentition, No lesions  NECK: Supple, No JVD, Normal thyroid  NERVOUS SYSTEM:  Alert & Oriented X3, Good concentration; Motor Strength 5/5 B/L upper and lower extremities; DTRs 2+ intact and symmetric  CHEST/LUNG: Clear to percussion bilaterally; No rales, rhonchi, wheezing, or rubs  HEART: Regular rate and rhythm; No murmurs, rubs, or gallops  ABDOMEN: Soft, Nontender, Nondistended; Bowel sounds present  EXTREMITIES:  2+ Peripheral Pulses, No clubbing, cyanosis, or edema  LYMPH: No lymphadenopathy noted  SKIN: No rashes or lesions    LABS:                        11.2   3.60  )-----------( 53       ( 2023 06:33 )             32.7         138  |  104  |  21  ----------------------------<  168<H>  4.4   |  22  |  1.02    Ca    9.5      2023 23:16    TPro  6.8  /  Alb  4.3  /  TBili  0.9  /  DBili  x   /  AST  48<H>  /  ALT  46<H>  /  AlkPhos  81  01-08      Urinalysis Basic - ( 2023 20:01 )    Color: Light Yellow / Appearance: Clear / S.009 / pH: x  Gluc: x / Ketone: Moderate  / Bili: Negative / Urobili: Negative   Blood: x / Protein: 30 mg/dL / Nitrite: Negative   Leuk Esterase: Negative / RBC: 70 /hpf / WBC 2 /HPF   Sq Epi: x / Non Sq Epi: 2 /hpf / Bacteria: Negative      CAPILLARY BLOOD GLUCOSE            Urinalysis Basic - ( 2023 20:01 )    Color: Light Yellow / Appearance: Clear / S.009 / pH: x  Gluc: x / Ketone: Moderate  / Bili: Negative / Urobili: Negative   Blood: x / Protein: 30 mg/dL / Nitrite: Negative   Leuk Esterase: Negative / RBC: 70 /hpf / WBC 2 /HPF   Sq Epi: x / Non Sq Epi: 2 /hpf / Bacteria: Negative        MEDICATIONS  (STANDING):  folic acid 1 milliGRAM(s) Oral daily  pantoprazole    Tablet 40 milliGRAM(s) Oral before breakfast  potassium chloride    Tablet ER 40 milliEquivalent(s) Oral two times a day  sodium chloride 0.9%. 1000 milliLiter(s) (100 mL/Hr) IV Continuous <Continuous>  thiamine 100 milliGRAM(s) Oral daily    MEDICATIONS  (PRN):      Care Discussed with Consultants/Other Providers [ ] YES  [ ] NO

## 2023-01-08 NOTE — PROGRESS NOTE ADULT - ASSESSMENT
A/P     Hypokalemia :  replace K   now corrected     ETOH withdrawal / Hallucination / anxiety d/o  -likely 2/2 ETOH abuse   watch for DT's  started on CIWA protocol   improving     dispo: if remain stable can be d/c home on ativan 1 mg PO bid x 7 days

## 2023-01-08 NOTE — ED ADULT NURSE REASSESSMENT NOTE - NS ED NURSE REASSESS COMMENT FT1
Report received from RN Norm José. Pt received A&Ox4 currently denying any pain or anxiety at this time. Vital signs retaken and stable. PT received on a 1:1 CO for risk of elopement and alcohol use, CIWA repeated with a score of 0. ED RN attempted to get in contact with provider to change PO tablet potassium to liquid as pt is unable to tolerate the large pills, no answer and pending call back. Report received from RN Norm José. Pt received A&Ox4 currently denying any pain or anxiety at this time. Vital signs retaken and stable. PT received on a 1:1 CO for anxiety pending psych consult, CIWA repeated with a score of 0. ED RN attempted to get in contact with provider to change PO tablet potassium to liquid as pt is unable to tolerate the large pills, no answer and pending call back.

## 2023-01-09 ENCOUNTER — TRANSCRIPTION ENCOUNTER (OUTPATIENT)
Age: 33
End: 2023-01-09

## 2023-01-09 VITALS
OXYGEN SATURATION: 100 % | SYSTOLIC BLOOD PRESSURE: 125 MMHG | RESPIRATION RATE: 18 BRPM | DIASTOLIC BLOOD PRESSURE: 85 MMHG | HEART RATE: 60 BPM | TEMPERATURE: 98 F

## 2023-01-09 LAB
ANION GAP SERPL CALC-SCNC: 12 MMOL/L — SIGNIFICANT CHANGE UP (ref 5–17)
BUN SERPL-MCNC: 14 MG/DL — SIGNIFICANT CHANGE UP (ref 7–23)
CALCIUM SERPL-MCNC: 8.4 MG/DL — SIGNIFICANT CHANGE UP (ref 8.4–10.5)
CHLORIDE SERPL-SCNC: 107 MMOL/L — SIGNIFICANT CHANGE UP (ref 96–108)
CO2 SERPL-SCNC: 20 MMOL/L — LOW (ref 22–31)
CREAT SERPL-MCNC: 0.66 MG/DL — SIGNIFICANT CHANGE UP (ref 0.5–1.3)
EGFR: 119 ML/MIN/1.73M2 — SIGNIFICANT CHANGE UP
GLUCOSE SERPL-MCNC: 159 MG/DL — HIGH (ref 70–99)
MAGNESIUM SERPL-MCNC: 1.7 MG/DL — SIGNIFICANT CHANGE UP (ref 1.6–2.6)
PHOSPHATE SERPL-MCNC: 2.5 MG/DL — SIGNIFICANT CHANGE UP (ref 2.5–4.5)
POTASSIUM SERPL-MCNC: 3.7 MMOL/L — SIGNIFICANT CHANGE UP (ref 3.5–5.3)
POTASSIUM SERPL-SCNC: 3.7 MMOL/L — SIGNIFICANT CHANGE UP (ref 3.5–5.3)
SODIUM SERPL-SCNC: 139 MMOL/L — SIGNIFICANT CHANGE UP (ref 135–145)

## 2023-01-09 PROCEDURE — U0003: CPT

## 2023-01-09 PROCEDURE — 85025 COMPLETE CBC W/AUTO DIFF WBC: CPT

## 2023-01-09 PROCEDURE — 96374 THER/PROPH/DIAG INJ IV PUSH: CPT

## 2023-01-09 PROCEDURE — 85027 COMPLETE CBC AUTOMATED: CPT

## 2023-01-09 PROCEDURE — 36415 COLL VENOUS BLD VENIPUNCTURE: CPT

## 2023-01-09 PROCEDURE — 99285 EMERGENCY DEPT VISIT HI MDM: CPT

## 2023-01-09 PROCEDURE — 80053 COMPREHEN METABOLIC PANEL: CPT

## 2023-01-09 PROCEDURE — 81001 URINALYSIS AUTO W/SCOPE: CPT

## 2023-01-09 PROCEDURE — 80307 DRUG TEST PRSMV CHEM ANLYZR: CPT

## 2023-01-09 PROCEDURE — 84702 CHORIONIC GONADOTROPIN TEST: CPT

## 2023-01-09 PROCEDURE — 83735 ASSAY OF MAGNESIUM: CPT

## 2023-01-09 PROCEDURE — 80048 BASIC METABOLIC PNL TOTAL CA: CPT

## 2023-01-09 PROCEDURE — 84443 ASSAY THYROID STIM HORMONE: CPT

## 2023-01-09 PROCEDURE — 84100 ASSAY OF PHOSPHORUS: CPT

## 2023-01-09 RX ORDER — THIAMINE MONONITRATE (VIT B1) 100 MG
1 TABLET ORAL
Qty: 0 | Refills: 0 | DISCHARGE
Start: 2023-01-09

## 2023-01-09 RX ORDER — PANTOPRAZOLE SODIUM 20 MG/1
1 TABLET, DELAYED RELEASE ORAL
Qty: 0 | Refills: 0 | DISCHARGE
Start: 2023-01-09

## 2023-01-09 RX ORDER — THIAMINE MONONITRATE (VIT B1) 100 MG
1 TABLET ORAL
Qty: 30 | Refills: 0
Start: 2023-01-09 | End: 2023-02-07

## 2023-01-09 RX ORDER — PANTOPRAZOLE SODIUM 20 MG/1
1 TABLET, DELAYED RELEASE ORAL
Qty: 30 | Refills: 0
Start: 2023-01-09 | End: 2023-02-07

## 2023-01-09 RX ORDER — LANOLIN ALCOHOL/MO/W.PET/CERES
3 CREAM (GRAM) TOPICAL ONCE
Refills: 0 | Status: COMPLETED | OUTPATIENT
Start: 2023-01-09 | End: 2023-01-09

## 2023-01-09 RX ORDER — FOLIC ACID 0.8 MG
1 TABLET ORAL
Qty: 0 | Refills: 0 | DISCHARGE
Start: 2023-01-09

## 2023-01-09 RX ORDER — FOLIC ACID 0.8 MG
1 TABLET ORAL
Qty: 30 | Refills: 0
Start: 2023-01-09 | End: 2023-02-07

## 2023-01-09 RX ADMIN — Medication 3 MILLIGRAM(S): at 02:06

## 2023-01-09 RX ADMIN — SODIUM CHLORIDE 100 MILLILITER(S): 9 INJECTION INTRAMUSCULAR; INTRAVENOUS; SUBCUTANEOUS at 10:03

## 2023-01-09 RX ADMIN — Medication 100 MILLIGRAM(S): at 11:41

## 2023-01-09 RX ADMIN — Medication 1 MILLIGRAM(S): at 11:41

## 2023-01-09 NOTE — DISCHARGE NOTE PROVIDER - NSDCHOSPICE_GEN_A_CORE
Patient is a 82y old  Male who presents with a chief complaint of Acute respiratory failure, DKA, severe sepsis, ANSLEY (13 Mar 2019 14:50)      INTERVAL HPI / OVERNIGHT EVENTS:    MEDICATIONS  (STANDING):  chlorhexidine 0.12% Liquid 15 milliLiter(s) Oral Mucosa two times a day  chlorhexidine 4% Liquid 1 Application(s) Topical <User Schedule>  dextrose 5%. 1000 milliLiter(s) (30 mL/Hr) IV Continuous <Continuous>  dextrose 5%. 1000 milliLiter(s) (50 mL/Hr) IV Continuous <Continuous>  dextrose 50% Injectable 12.5 Gram(s) IV Push once  dextrose 50% Injectable 25 Gram(s) IV Push once  dextrose 50% Injectable 25 Gram(s) IV Push once  fentaNYL   Infusion. 0.5 MICROgram(s)/kG/Hr (2.525 mL/Hr) IV Continuous <Continuous>  heparin  Injectable 5000 Unit(s) SubCutaneous every 12 hours  insulin glargine Injectable (LANTUS) 20 Unit(s) SubCutaneous at bedtime  insulin lispro (HumaLOG) corrective regimen sliding scale   SubCutaneous every 6 hours  meropenem  IVPB 2000 milliGRAM(s) IV Intermittent every 12 hours  meropenem  IVPB      midodrine 20 milliGRAM(s) Oral every 8 hours  norepinephrine Infusion 0.05 MICROgram(s)/kG/Min (4.734 mL/Hr) IV Continuous <Continuous>  pantoprazole   Suspension 40 milliGRAM(s) Oral daily    MEDICATIONS  (PRN):  acetaminophen    Suspension .. 650 milliGRAM(s) Oral every 6 hours PRN Temp greater or equal to 38C (100.4F)  dextrose 40% Gel 15 Gram(s) Oral once PRN Blood Glucose LESS THAN 70 milliGRAM(s)/deciliter  glucagon  Injectable 1 milliGRAM(s) IntraMuscular once PRN Glucose LESS THAN 70 milligrams/deciliter      Vital Signs Last 24 Hrs  T(C): 37.1 (14 Mar 2019 15:00), Max: 38.1 (14 Mar 2019 07:30)  T(F): 98.8 (14 Mar 2019 15:00), Max: 100.5 (14 Mar 2019 07:30)  HR: 88 (14 Mar 2019 16:36) (75 - 98)  BP: 129/48 (14 Mar 2019 16:30) (69/35 - 145/56)  BP(mean): 69 (14 Mar 2019 16:30) (43 - 93)  RR: 17 (14 Mar 2019 16:30) (14 - 34)  SpO2: 100% (14 Mar 2019 16:36) (97% - 100%)    Review of systems:  General : no fever /chills,fatigue  CVS : no chest pain, palpitations  Lungs : no shortness of breath, cough  GI : no abdominal pain,vomiting, diarrhea   : no dysuria,hematuria        PHYSICAL EXAM:  General :NAD  Constitutional:  well-groomed, well-developed  Respiratory: CTAB/L  Cardiovascular: S1 and S2, RRR, no M/G/R  Gastrointestinal: BS+, soft, NT/ND  Extremities: No peripheral edema  Vascular: 2+ peripheral pulses  Skin: No rashes      LABS:                        6.3    28.46 )-----------( 444      ( 14 Mar 2019 05:46 )             19.0     03-14    140  |  109<H>  |  45<H>  ----------------------------<  151<H>  3.1<L>   |  21<L>  |  0.98    Ca    6.9<L>      14 Mar 2019 03:41  Phos  4.0     03-14  Mg     1.9     03-14    TPro  4.8<L>  /  Alb  1.2<L>  /  TBili  1.7<H>  /  DBili  x   /  AST  12<L>  /  ALT  10<L>  /  AlkPhos  75  03-14          MICROBIOLOGY:  RECENT CULTURES:        RADIOLOGY & ADDITIONAL STUDIES: Patient is a 82y old  Male who presents with a chief complaint of Acute respiratory failure, DKA, severe sepsis, ANSLEY (13 Mar 2019 14:50)      INTERVAL HPI / OVERNIGHT EVENTS: no new events    MEDICATIONS  (STANDING):  chlorhexidine 0.12% Liquid 15 milliLiter(s) Oral Mucosa two times a day  chlorhexidine 4% Liquid 1 Application(s) Topical <User Schedule>  dextrose 5%. 1000 milliLiter(s) (30 mL/Hr) IV Continuous <Continuous>  dextrose 5%. 1000 milliLiter(s) (50 mL/Hr) IV Continuous <Continuous>  dextrose 50% Injectable 12.5 Gram(s) IV Push once  dextrose 50% Injectable 25 Gram(s) IV Push once  dextrose 50% Injectable 25 Gram(s) IV Push once  fentaNYL   Infusion. 0.5 MICROgram(s)/kG/Hr (2.525 mL/Hr) IV Continuous <Continuous>  heparin  Injectable 5000 Unit(s) SubCutaneous every 12 hours  insulin glargine Injectable (LANTUS) 20 Unit(s) SubCutaneous at bedtime  insulin lispro (HumaLOG) corrective regimen sliding scale   SubCutaneous every 6 hours  meropenem  IVPB 2000 milliGRAM(s) IV Intermittent every 12 hours  meropenem  IVPB      midodrine 20 milliGRAM(s) Oral every 8 hours  norepinephrine Infusion 0.05 MICROgram(s)/kG/Min (4.734 mL/Hr) IV Continuous <Continuous>  pantoprazole   Suspension 40 milliGRAM(s) Oral daily    MEDICATIONS  (PRN):  acetaminophen    Suspension .. 650 milliGRAM(s) Oral every 6 hours PRN Temp greater or equal to 38C (100.4F)  dextrose 40% Gel 15 Gram(s) Oral once PRN Blood Glucose LESS THAN 70 milliGRAM(s)/deciliter  glucagon  Injectable 1 milliGRAM(s) IntraMuscular once PRN Glucose LESS THAN 70 milligrams/deciliter      Vital Signs Last 24 Hrs  T(C): 37.1 (14 Mar 2019 15:00), Max: 38.1 (14 Mar 2019 07:30)  T(F): 98.8 (14 Mar 2019 15:00), Max: 100.5 (14 Mar 2019 07:30)  HR: 88 (14 Mar 2019 16:36) (75 - 98)  BP: 129/48 (14 Mar 2019 16:30) (69/35 - 145/56)  BP(mean): 69 (14 Mar 2019 16:30) (43 - 93)  RR: 17 (14 Mar 2019 16:30) (14 - 34)  SpO2: 100% (14 Mar 2019 16:36) (97% - 100%)    Review of systems:  can't be done      PHYSICAL EXAM:  General :NAD  Constitutional: thin built +   Respiratory: CTAB/L  Cardiovascular: S1 and S2, RRR, no M/G/R  Gastrointestinal: BS+, soft, NT/ND  Extremities: No peripheral edema  Vascular: 2+ peripheral pulses  Skin: sacral decub +, heel and malleoli pressure ulcers      LABS:                        6.3    28.46 )-----------( 444      ( 14 Mar 2019 05:46 )             19.0     03-14    140  |  109<H>  |  45<H>  ----------------------------<  151<H>  3.1<L>   |  21<L>  |  0.98    Ca    6.9<L>      14 Mar 2019 03:41  Phos  4.0     03-14  Mg     1.9     03-14    TPro  4.8<L>  /  Alb  1.2<L>  /  TBili  1.7<H>  /  DBili  x   /  AST  12<L>  /  ALT  10<L>  /  AlkPhos  75  03-14          MICROBIOLOGY:  RECENT CULTURES:        RADIOLOGY & ADDITIONAL STUDIES: No

## 2023-01-09 NOTE — DISCHARGE NOTE PROVIDER - HOSPITAL COURSE
32-year-old female with a past medical history of daily alcohol abuse prior cocaine use anxiety presents to the ER today for anxiety.  Accompanied by cousin at bedside who states that patient was claiming that her neighbors were reporting her.  Patient has also been disclosing to family she that she is hearing voices.  Has not had any alcoholic beverages in the past 4 days.  No history of alcohol withdrawal seizures NICU admissions or intubations in the past.  Denies SI HI.  No prior psychiatric hospitalizations.  Denies any active drug use.       ETOH withdrawal / Hallucination / anxiety d/o  -likely 2/2 ETOH abuse   - Monitored on CIWA protocol     Hypokalemia   replace K   now corrected     On_________, discussed with __________, patient is medically cleared and optimized for discharge today. All medications were reviewed with attending, and sent to mutually agreed upon pharmacy.   32-year-old female with a past medical history of daily alcohol abuse prior cocaine use anxiety presents to the ER today for anxiety.  Accompanied by cousin at bedside who states that patient was claiming that her neighbors were reporting her.  Patient has also been disclosing to family she that she is hearing voices.  Has not had any alcoholic beverages in the past 4 days.  No history of alcohol withdrawal seizures NICU admissions or intubations in the past.  Denies SI HI.  No prior psychiatric hospitalizations.  Denies any active drug use.       ETOH withdrawal / Hallucination / anxiety d/o  -likely 2/2 ETOH abuse   - Monitored on CIWA protocol   - CIWA score 0 on day of discharge  - Per Dr. Mejia, send tp on ativan 1mg BID x 5 days prn agitation/ anxiety     Hypokalemia   replace K   now corrected     On 1/9/2023, discussed with Dr. Mejia, patient is medically cleared and optimized for discharge today. All medications were reviewed with attending, and sent to mutually agreed upon pharmacy.

## 2023-01-09 NOTE — DISCHARGE NOTE NURSING/CASE MANAGEMENT/SOCIAL WORK - PATIENT PORTAL LINK FT
You can access the FollowMyHealth Patient Portal offered by [facility] by registering at the following website: [email or web address]. By joining [employer]’s FollowMyHealth portal, you will also be able to view your health information using other applications (apps) compatible with our system.

## 2023-01-09 NOTE — DISCHARGE NOTE PROVIDER - NSDCCPCAREPLAN_GEN_ALL_CORE_FT
PRINCIPAL DISCHARGE DIAGNOSIS  Diagnosis: Alcohol dependence with withdrawal  Assessment and Plan of Treatment: You were admitted for anxiety. You were montiored for alcohol withdrawal. Attend alcoholism treatment program, practice the Twelve Steps of AA.  Go to meetings to help you cope with uncomfortable feelings, and to help you develop a relapse prevention plan to prevent complications associated with alcohol intoxication.        SECONDARY DISCHARGE DIAGNOSES  Diagnosis: Low serum potassium  Assessment and Plan of Treatment: You were found to have low potassium. You are to follow up with PCP as outpatient for further monitoring of potassium levels as outpatient.

## 2023-01-09 NOTE — ED ADULT NURSE REASSESSMENT NOTE - NS ED NURSE REASSESS COMMENT FT1
Report received from Michelle MOODY Pt resting comfortably 1:1 at bedside pt safety being maintained.

## 2023-01-09 NOTE — CHART NOTE - NSCHARTNOTEFT_GEN_A_CORE
Called by the nurse to report that potassium 2.9. Patient is asymptomatic. Potassium supplement . F/u repeat BMP
Per Dr. Mejia, send Ativan 1mg BID x 5 days prn alcohol withdrawal symptoms  Side effects of medications discussed with pt at length. Pt verbalized understanding     Search Terms: crystal Mckinley, 1990Search Date: 01/09/2023 13:02:02 PM  The Drug Utilization Report below displays all of the controlled substance prescriptions, if any, that your patient has filled in the last twelve months. The information displayed on this report is compiled from pharmacy submissions to the Department, and accurately reflects the information as submitted by the pharmacies.    This report was requested by: Argenis [facility] | Reference #: [id number]    There are no results for the search terms that you entered.    Argenis Heart PA-C  Department of Medicine  Pager [phone]

## 2023-01-09 NOTE — ED ADULT NURSE REASSESSMENT NOTE - NS ED NURSE REASSESS COMMENT FT1
Pt medicated as per ACP order with melatonin as pt states she was having difficulty falling asleep. Vital signs retaken and stable. Pending psych consult.

## 2023-01-09 NOTE — SBIRT NOTE ADULT - NSSBIRTALCPASSREFTXDET_GEN_A_CORE
LMSW explored alcohol use with patient. Patient states she has been drinking 1 bottle of wine per day for two weeks straight. She attributes this to stress as she is actively looking for employment and also celebrating due to the holidays. She states she stopped drinking completely this past Tuesday and did not engage in any treatment programs and stopped on her own. She states she feels well and is motivated to stay sober. LMSW provided education and counseling to patient on ETOH use to which she was receptive. Anxiety also explored but patient states she believes the anxiety came as a symptom of withdrawing from alcohol. She states her parents are aware of her hospitalization and are very supportive. LMSW provided education on available SBIRT resources as well as resources for outpatient behavorial health services. Patient receptive to and provided with these resources by LMSW. Contact info provided and ongoing social work availability ensured. SW remains available.

## 2023-01-09 NOTE — DISCHARGE NOTE PROVIDER - NSFOLLOWUPCLINICS_GEN_ALL_ED_FT
[facility]  Psychiatry  [address] [place] [address]  Phone: [phone]  Fax:

## 2023-01-09 NOTE — DISCHARGE NOTE PROVIDER - NSDCMRMEDTOKEN_GEN_ALL_CORE_FT
Ativan 1 mg oral tablet: 1 tab(s) orally 2 times a day -for agitation or  for anxiety MDD:2 tabs a day   folic acid 1 mg oral tablet: 1 tab(s) orally once a day  pantoprazole 40 mg oral delayed release tablet: 1 tab(s) orally once a day (before a meal)  thiamine 100 mg oral tablet: 1 tab(s) orally once a day

## 2023-01-09 NOTE — DISCHARGE NOTE PROVIDER - NSDCFUSCHEDAPPT_GEN_ALL_CORE_FT
[facility]  INT[facility] [address] [facility]  Scheduled Appointment: 01/18/2023

## 2023-01-13 ENCOUNTER — OUTPATIENT (OUTPATIENT)
Dept: OUTPATIENT SERVICES | Facility: HOSPITAL | Age: 33
LOS: 1 days | Discharge: STILL ADMITTED OR ADMINISTRATIVE | End: 2023-01-13

## 2023-01-13 ENCOUNTER — NON-APPOINTMENT (OUTPATIENT)
Age: 33
End: 2023-01-13

## 2023-01-18 ENCOUNTER — APPOINTMENT (OUTPATIENT)
Dept: INTERNAL MEDICINE | Facility: CLINIC | Age: 33
End: 2023-01-18
Payer: MEDICAID

## 2023-01-18 VITALS
DIASTOLIC BLOOD PRESSURE: 80 MMHG | BODY MASS INDEX: 22.82 KG/M2 | WEIGHT: 124 LBS | TEMPERATURE: 98 F | SYSTOLIC BLOOD PRESSURE: 110 MMHG | HEART RATE: 64 BPM | HEIGHT: 62 IN | OXYGEN SATURATION: 96 %

## 2023-01-18 DIAGNOSIS — Z00.00 ENCOUNTER FOR GENERAL ADULT MEDICAL EXAMINATION W/OUT ABNORMAL FINDINGS: ICD-10-CM

## 2023-01-18 DIAGNOSIS — F41.0 PANIC DISORDER [EPISODIC PAROXYSMAL ANXIETY]: ICD-10-CM

## 2023-01-18 DIAGNOSIS — Z12.4 ENCOUNTER FOR SCREENING FOR MALIGNANT NEOPLASM OF CERVIX: ICD-10-CM

## 2023-01-18 PROCEDURE — 99385 PREV VISIT NEW AGE 18-39: CPT | Mod: 25

## 2023-01-19 ENCOUNTER — APPOINTMENT (OUTPATIENT)
Dept: OBGYN | Facility: CLINIC | Age: 33
End: 2023-01-19
Payer: MEDICAID

## 2023-01-19 VITALS
DIASTOLIC BLOOD PRESSURE: 84 MMHG | HEIGHT: 62 IN | OXYGEN SATURATION: 98 % | RESPIRATION RATE: 18 BRPM | HEART RATE: 70 BPM | BODY MASS INDEX: 22.08 KG/M2 | WEIGHT: 120 LBS | SYSTOLIC BLOOD PRESSURE: 133 MMHG

## 2023-01-19 DIAGNOSIS — Z01.419 ENCOUNTER FOR GYNECOLOGICAL EXAMINATION (GENERAL) (ROUTINE) W/OUT ABNORMAL FINDINGS: ICD-10-CM

## 2023-01-19 PROCEDURE — 99395 PREV VISIT EST AGE 18-39: CPT

## 2023-01-20 LAB
C TRACH RRNA SPEC QL NAA+PROBE: NOT DETECTED
HBV SURFACE AG SER QL: NONREACTIVE
HCV AB SER QL: NONREACTIVE
HCV S/CO RATIO: 0.08 S/CO
HIV1+2 AB SPEC QL IA.RAPID: NONREACTIVE
HPV HIGH+LOW RISK DNA PNL CVX: NOT DETECTED
N GONORRHOEA RRNA SPEC QL NAA+PROBE: NOT DETECTED
SOURCE AMPLIFICATION: NORMAL
T PALLIDUM AB SER QL IA: NEGATIVE

## 2023-01-23 PROBLEM — Z12.4 CERVICAL CANCER SCREENING: Status: ACTIVE | Noted: 2020-10-01

## 2023-01-23 LAB — CYTOLOGY CVX/VAG DOC THIN PREP: NORMAL

## 2023-01-23 NOTE — HISTORY OF PRESENT ILLNESS
[FreeTextEntry1] : New patient CPE [de-identified] : Ms. SHAW is a 32 year year old female that presents to the office as a new patient for a CPE. The patient's main concern pertains to a recent hospital admission. Two weeks ago, the patient was evaluated in the ED for a panic attack. She was found to be in acute ETOH withdrawal and was subsequently admitted. The patient admits to self-medicating with ETOH for anxiety and stresses such as difficulty with employment opportunities. The holiday season was particularly prevalent for consumption. The patient was treated with a librium taper, potassium supplementation and was d/c w/ ativan. She has used it sparingly and has been going to AA meetings with her mother. She is very grounded and aware of her use and the relationship with anxiety and seems to be in an improved mental state. The patient followed up with behavioral health and was prescribed escitalopram 5mg and hydroxyzine PRN; compliant with medication. She has no other complaints at this time.

## 2023-01-23 NOTE — HEALTH RISK ASSESSMENT
[Good] : ~his/her~  mood as  good [Never] : Never [0-4] : 0-4 [Yes] : Yes [No] : In the past 12 months have you used drugs other than those required for medical reasons? No [No falls in past year] : Patient reported no falls in the past year [Patient reported PAP Smear was normal] : Patient reported PAP Smear was normal [With Family] : lives with family [Unemployed] : unemployed [College] : College [Single] : single [# Of Children ___] : has [unfilled] children [Sexually Active] : sexually active [Feels Safe at Home] : Feels safe at home [Patient/Caregiver not ready to engage] : , patient/caregiver not ready to engage [FreeTextEntry1] : Anxiety, panic attack [de-identified] : Behavioral health walk in clinic [de-identified] : Previously consuming 1 bottle wine per day or equivalent, now zero ETOH use [de-identified] : Gym, walking [de-identified] : Regular, 3 meals/day [de-identified] : Patient following with behavioral health, new referral provided today [Change in mental status noted] : No change in mental status noted [Language] : denies difficulty with language [High Risk Behavior] : no high risk behavior [Reports changes in hearing] : Reports no changes in hearing [Reports changes in vision] : Reports no changes in vision [FreeTextEntry2] :  [AdvancecareDate] : 01/18

## 2023-01-24 DIAGNOSIS — Z23 ENCOUNTER FOR IMMUNIZATION: ICD-10-CM

## 2023-01-24 LAB
ALBUMIN SERPL ELPH-MCNC: 4.5 G/DL
ALP BLD-CCNC: 68 U/L
ALT SERPL-CCNC: 40 U/L
ANION GAP SERPL CALC-SCNC: 11 MMOL/L
APPEARANCE: CLEAR
AST SERPL-CCNC: 22 U/L
BACTERIA: NEGATIVE
BASOPHILS # BLD AUTO: 0.06 K/UL
BASOPHILS NFR BLD AUTO: 1 %
BILIRUB SERPL-MCNC: 0.3 MG/DL
BILIRUBIN URINE: NEGATIVE
BLOOD URINE: NEGATIVE
BUN SERPL-MCNC: 14 MG/DL
CALCIUM SERPL-MCNC: 10 MG/DL
CHLORIDE SERPL-SCNC: 103 MMOL/L
CHOLEST SERPL-MCNC: 163 MG/DL
CO2 SERPL-SCNC: 26 MMOL/L
COLOR: YELLOW
CREAT SERPL-MCNC: 0.65 MG/DL
EGFR: 119 ML/MIN/1.73M2
EOSINOPHIL # BLD AUTO: 0.08 K/UL
EOSINOPHIL NFR BLD AUTO: 1.3 %
ESTIMATED AVERAGE GLUCOSE: 123 MG/DL
FERRITIN SERPL-MCNC: 67 NG/ML
FOLATE SERPL-MCNC: >20 NG/ML
GLUCOSE QUALITATIVE U: NEGATIVE
GLUCOSE SERPL-MCNC: 101 MG/DL
HBA1C MFR BLD HPLC: 5.9 %
HCT VFR BLD CALC: 38.5 %
HDLC SERPL-MCNC: 61 MG/DL
HGB BLD-MCNC: 12 G/DL
HYALINE CASTS: 3 /LPF
IMM GRANULOCYTES NFR BLD AUTO: 0.3 %
IRON SATN MFR SERPL: 18 %
IRON SERPL-MCNC: 59 UG/DL
KETONES URINE: NEGATIVE
LDLC SERPL CALC-MCNC: 88 MG/DL
LEUKOCYTE ESTERASE URINE: NEGATIVE
LYMPHOCYTES # BLD AUTO: 1.06 K/UL
LYMPHOCYTES NFR BLD AUTO: 17 %
M TB IFN-G BLD-IMP: NEGATIVE
MAN DIFF?: NORMAL
MCHC RBC-ENTMCNC: 31.2 GM/DL
MCHC RBC-ENTMCNC: 31.9 PG
MCV RBC AUTO: 102.4 FL
MEV IGG FLD QL IA: <5 AU/ML
MEV IGG+IGM SER-IMP: NEGATIVE
MICROSCOPIC-UA: NORMAL
MONOCYTES # BLD AUTO: 0.45 K/UL
MONOCYTES NFR BLD AUTO: 7.2 %
MUV AB SER-ACNC: NEGATIVE
MUV IGG SER QL IA: 8.9 AU/ML
NEUTROPHILS # BLD AUTO: 4.57 K/UL
NEUTROPHILS NFR BLD AUTO: 73.2 %
NITRITE URINE: NEGATIVE
NONHDLC SERPL-MCNC: 102 MG/DL
PH URINE: 7
PLATELET # BLD AUTO: 481 K/UL
POTASSIUM SERPL-SCNC: 4.3 MMOL/L
PROT SERPL-MCNC: 7 G/DL
PROTEIN URINE: NEGATIVE
QUANTIFERON TB PLUS MITOGEN MINUS NIL: 8.91 IU/ML
QUANTIFERON TB PLUS NIL: 0.03 IU/ML
QUANTIFERON TB PLUS TB1 MINUS NIL: 0 IU/ML
QUANTIFERON TB PLUS TB2 MINUS NIL: 0 IU/ML
RBC # BLD: 3.76 M/UL
RBC # FLD: 12.3 %
RED BLOOD CELLS URINE: 2 /HPF
RUBV IGG FLD-ACNC: 0.7 INDEX
RUBV IGG SER-IMP: NEGATIVE
SODIUM SERPL-SCNC: 140 MMOL/L
SPECIFIC GRAVITY URINE: 1.02
SQUAMOUS EPITHELIAL CELLS: 2 /HPF
T4 SERPL-MCNC: 6.6 UG/DL
TIBC SERPL-MCNC: 321 UG/DL
TRIGL SERPL-MCNC: 72 MG/DL
TSH SERPL-ACNC: 1.66 UIU/ML
UIBC SERPL-MCNC: 262 UG/DL
UROBILINOGEN URINE: NORMAL
VIT B12 SERPL-MCNC: 587 PG/ML
WBC # FLD AUTO: 6.24 K/UL
WHITE BLOOD CELLS URINE: 1 /HPF

## 2023-01-25 ENCOUNTER — APPOINTMENT (OUTPATIENT)
Dept: INTERNAL MEDICINE | Facility: CLINIC | Age: 33
End: 2023-01-25
Payer: MEDICAID

## 2023-01-25 PROCEDURE — 90707 MMR VACCINE SC: CPT

## 2023-01-25 PROCEDURE — 90471 IMMUNIZATION ADMIN: CPT

## 2023-02-08 DIAGNOSIS — F10.20 ALCOHOL DEPENDENCE, UNCOMPLICATED: ICD-10-CM

## 2023-03-14 ENCOUNTER — NON-APPOINTMENT (OUTPATIENT)
Age: 33
End: 2023-03-14

## 2023-03-21 RX ORDER — NORETHINDRONE ACETATE AND ETHINYL ESTRADIOL, ETHINYL ESTRADIOL AND FERROUS FUMARATE 1MG-10(24)
1 MG-10 MCG / KIT ORAL DAILY
Qty: 1 | Refills: 5 | Status: ACTIVE | COMMUNITY
Start: 2023-01-19

## 2023-03-22 ENCOUNTER — NON-APPOINTMENT (OUTPATIENT)
Age: 33
End: 2023-03-22

## 2023-03-27 ENCOUNTER — NON-APPOINTMENT (OUTPATIENT)
Age: 33
End: 2023-03-27

## 2023-03-27 DIAGNOSIS — Z30.49 ENCOUNTER FOR SURVEILLANCE OF OTHER CONTRACEPTIVES: ICD-10-CM

## 2023-05-30 RX ORDER — NORETHINDRONE ACETATE AND ETHINYL ESTRADIOL AND FERROUS FUMARATE 1MG-20(21)
1-20 KIT ORAL
Qty: 3 | Refills: 3 | Status: ACTIVE | COMMUNITY
Start: 2023-03-27 | End: 1900-01-01

## 2023-08-31 ENCOUNTER — RX RENEWAL (OUTPATIENT)
Age: 33
End: 2023-08-31

## 2023-10-17 ENCOUNTER — RX RENEWAL (OUTPATIENT)
Age: 33
End: 2023-10-17

## 2023-11-29 ENCOUNTER — RX RENEWAL (OUTPATIENT)
Age: 33
End: 2023-11-29

## 2023-11-29 RX ORDER — ESCITALOPRAM OXALATE 10 MG/1
10 TABLET ORAL DAILY
Qty: 90 | Refills: 1 | Status: ACTIVE | COMMUNITY
Start: 2023-01-18 | End: 1900-01-01

## 2024-01-29 ENCOUNTER — APPOINTMENT (OUTPATIENT)
Dept: OBGYN | Facility: CLINIC | Age: 34
End: 2024-01-29
Payer: MEDICAID

## 2024-01-29 VITALS
HEIGHT: 62 IN | SYSTOLIC BLOOD PRESSURE: 133 MMHG | BODY MASS INDEX: 23.19 KG/M2 | WEIGHT: 126 LBS | HEART RATE: 85 BPM | DIASTOLIC BLOOD PRESSURE: 91 MMHG

## 2024-01-29 DIAGNOSIS — Z00.00 ENCOUNTER FOR GENERAL ADULT MEDICAL EXAMINATION W/OUT ABNORMAL FINDINGS: ICD-10-CM

## 2024-01-29 PROCEDURE — 99395 PREV VISIT EST AGE 18-39: CPT

## 2024-01-29 PROCEDURE — 99459 PELVIC EXAMINATION: CPT

## 2024-01-29 RX ORDER — NORETHINDRONE ACETATE AND ETHINYL ESTRADIOL AND FERROUS FUMARATE 1MG-20(21)
1-20 KIT ORAL
Qty: 1 | Refills: 3 | Status: ACTIVE | COMMUNITY
Start: 2024-01-29 | End: 1900-01-01

## 2024-01-29 NOTE — HISTORY OF PRESENT ILLNESS
[FreeTextEntry1] : 32 y/o presents for annual visit. No GYN complaints.  LMP Dec, monthly periods, last 4 days  Not currently sexually active, on Junel OCP. accepts STI testing   Of note, pt reports her mother's GI doctor told her to get her first colonoscopy prior to age 39, unsure why. Denies FH of colon cancer.  HM Pap NILM HPV neg 2023 Received Gardasil

## 2024-01-29 NOTE — PHYSICAL EXAM
[Chaperone Present] : A chaperone was present in the examining room during all aspects of the physical examination [FreeTextEntry1] : RASHAWN Cook [Appropriately responsive] : appropriately responsive [Alert] : alert [No Acute Distress] : no acute distress [No Lymphadenopathy] : no lymphadenopathy [Regular Rate Rhythm] : regular rate rhythm [No Murmurs] : no murmurs [Clear to Auscultation B/L] : clear to auscultation bilaterally [Soft] : soft [Non-tender] : non-tender [Non-distended] : non-distended [No HSM] : No HSM [No Lesions] : no lesions [No Mass] : no mass [Oriented x3] : oriented x3 [Examination Of The Breasts] : a normal appearance [No Masses] : no breast masses were palpable [Labia Majora] : normal [Labia Minora] : normal [Normal] : normal [Tenderness] : nontender [Enlarged ___ wks] : not enlarged [Uterine Adnexae] : normal

## 2024-01-29 NOTE — PLAN
[FreeTextEntry1] : 32 y/o presents for annual visit.   #HM -Pap with HPV today -Refill OCP sent -STI panel -GI referral for colonoscopy provided   RTO 1 year or PRN karl KERR

## 2024-01-30 LAB
HBV SURFACE AG SER QL: NONREACTIVE
HCV AB SER QL: NONREACTIVE
HCV S/CO RATIO: 0.08 S/CO
HIV1+2 AB SPEC QL IA.RAPID: NONREACTIVE
T PALLIDUM AB SER QL IA: NEGATIVE

## 2024-01-31 LAB
C TRACH RRNA SPEC QL NAA+PROBE: NOT DETECTED
N GONORRHOEA RRNA SPEC QL NAA+PROBE: NOT DETECTED
SOURCE AMPLIFICATION: NORMAL

## 2024-02-08 ENCOUNTER — RX RENEWAL (OUTPATIENT)
Age: 34
End: 2024-02-08

## 2024-03-20 ENCOUNTER — EMERGENCY (EMERGENCY)
Facility: HOSPITAL | Age: 34
LOS: 1 days | Discharge: HOME | End: 2024-03-20
Attending: EMERGENCY MEDICINE
Payer: MEDICAID

## 2024-03-20 VITALS
HEART RATE: 122 BPM | RESPIRATION RATE: 16 BRPM | SYSTOLIC BLOOD PRESSURE: 139 MMHG | OXYGEN SATURATION: 96 % | HEIGHT: 63 IN | DIASTOLIC BLOOD PRESSURE: 100 MMHG | WEIGHT: 115.96 LBS | TEMPERATURE: 98 F

## 2024-03-20 LAB
ALBUMIN SERPL ELPH-MCNC: 4.8 G/DL — SIGNIFICANT CHANGE UP (ref 3.3–5)
ALP SERPL-CCNC: 108 U/L — SIGNIFICANT CHANGE UP (ref 40–120)
ALT FLD-CCNC: 56 U/L — HIGH (ref 10–45)
ANION GAP SERPL CALC-SCNC: 24 MMOL/L — HIGH (ref 5–17)
APTT BLD: 25.1 SEC — SIGNIFICANT CHANGE UP (ref 24.5–35.6)
AST SERPL-CCNC: 55 U/L — HIGH (ref 10–40)
BASE EXCESS BLDV CALC-SCNC: 6.4 MMOL/L — HIGH (ref -2–3)
BASOPHILS # BLD AUTO: 0.04 K/UL — SIGNIFICANT CHANGE UP (ref 0–0.2)
BASOPHILS NFR BLD AUTO: 0.6 % — SIGNIFICANT CHANGE UP (ref 0–2)
BILIRUB SERPL-MCNC: 0.8 MG/DL — SIGNIFICANT CHANGE UP (ref 0.2–1.2)
BUN SERPL-MCNC: 12 MG/DL — SIGNIFICANT CHANGE UP (ref 7–23)
CA-I SERPL-SCNC: 1.1 MMOL/L — LOW (ref 1.15–1.33)
CALCIUM SERPL-MCNC: 9.6 MG/DL — SIGNIFICANT CHANGE UP (ref 8.4–10.5)
CHLORIDE BLDV-SCNC: 93 MMOL/L — LOW (ref 96–108)
CHLORIDE SERPL-SCNC: 90 MMOL/L — LOW (ref 96–108)
CO2 BLDV-SCNC: 33 MMOL/L — HIGH (ref 22–26)
CO2 SERPL-SCNC: 27 MMOL/L — SIGNIFICANT CHANGE UP (ref 22–31)
CREAT SERPL-MCNC: 0.81 MG/DL — SIGNIFICANT CHANGE UP (ref 0.5–1.3)
EGFR: 98 ML/MIN/1.73M2 — SIGNIFICANT CHANGE UP
EOSINOPHIL # BLD AUTO: 0 K/UL — SIGNIFICANT CHANGE UP (ref 0–0.5)
EOSINOPHIL NFR BLD AUTO: 0 % — SIGNIFICANT CHANGE UP (ref 0–6)
GAS PNL BLDV: 139 MMOL/L — SIGNIFICANT CHANGE UP (ref 136–145)
GAS PNL BLDV: SIGNIFICANT CHANGE UP
GAS PNL BLDV: SIGNIFICANT CHANGE UP
GLUCOSE BLDV-MCNC: 286 MG/DL — HIGH (ref 70–99)
GLUCOSE SERPL-MCNC: 263 MG/DL — HIGH (ref 70–99)
HCG SERPL-ACNC: <2 MIU/ML — SIGNIFICANT CHANGE UP
HCO3 BLDV-SCNC: 32 MMOL/L — HIGH (ref 22–29)
HCT VFR BLD CALC: 46 % — HIGH (ref 34.5–45)
HCT VFR BLDA CALC: 49 % — HIGH (ref 34.5–46.5)
HGB BLD CALC-MCNC: 16.2 G/DL — HIGH (ref 11.7–16.1)
HGB BLD-MCNC: 16.1 G/DL — HIGH (ref 11.5–15.5)
IMM GRANULOCYTES NFR BLD AUTO: 0.3 % — SIGNIFICANT CHANGE UP (ref 0–0.9)
INR BLD: 1.28 RATIO — HIGH (ref 0.85–1.18)
LACTATE BLDV-MCNC: 7.6 MMOL/L — CRITICAL HIGH (ref 0.5–2)
LYMPHOCYTES # BLD AUTO: 0.91 K/UL — LOW (ref 1–3.3)
LYMPHOCYTES # BLD AUTO: 14 % — SIGNIFICANT CHANGE UP (ref 13–44)
MCHC RBC-ENTMCNC: 31.3 PG — SIGNIFICANT CHANGE UP (ref 27–34)
MCHC RBC-ENTMCNC: 35 GM/DL — SIGNIFICANT CHANGE UP (ref 32–36)
MCV RBC AUTO: 89.5 FL — SIGNIFICANT CHANGE UP (ref 80–100)
MONOCYTES # BLD AUTO: 0.18 K/UL — SIGNIFICANT CHANGE UP (ref 0–0.9)
MONOCYTES NFR BLD AUTO: 2.8 % — SIGNIFICANT CHANGE UP (ref 2–14)
NEUTROPHILS # BLD AUTO: 5.35 K/UL — SIGNIFICANT CHANGE UP (ref 1.8–7.4)
NEUTROPHILS NFR BLD AUTO: 82.3 % — HIGH (ref 43–77)
NRBC # BLD: 0 /100 WBCS — SIGNIFICANT CHANGE UP (ref 0–0)
PCO2 BLDV: 47 MMHG — HIGH (ref 39–42)
PH BLDV: 7.44 — HIGH (ref 7.32–7.43)
PLATELET # BLD AUTO: 150 K/UL — SIGNIFICANT CHANGE UP (ref 150–400)
PO2 BLDV: 27 MMHG — SIGNIFICANT CHANGE UP (ref 25–45)
POTASSIUM BLDV-SCNC: 3.7 MMOL/L — SIGNIFICANT CHANGE UP (ref 3.5–5.1)
POTASSIUM SERPL-MCNC: 3.8 MMOL/L — SIGNIFICANT CHANGE UP (ref 3.5–5.3)
POTASSIUM SERPL-SCNC: 3.8 MMOL/L — SIGNIFICANT CHANGE UP (ref 3.5–5.3)
PROT SERPL-MCNC: 7.5 G/DL — SIGNIFICANT CHANGE UP (ref 6–8.3)
PROTHROM AB SERPL-ACNC: 13.3 SEC — HIGH (ref 9.5–13)
RBC # BLD: 5.14 M/UL — SIGNIFICANT CHANGE UP (ref 3.8–5.2)
RBC # FLD: 12.7 % — SIGNIFICANT CHANGE UP (ref 10.3–14.5)
SAO2 % BLDV: 39.2 % — LOW (ref 67–88)
SODIUM SERPL-SCNC: 141 MMOL/L — SIGNIFICANT CHANGE UP (ref 135–145)
WBC # BLD: 6.5 K/UL — SIGNIFICANT CHANGE UP (ref 3.8–10.5)
WBC # FLD AUTO: 6.5 K/UL — SIGNIFICANT CHANGE UP (ref 3.8–10.5)

## 2024-03-20 PROCEDURE — 99285 EMERGENCY DEPT VISIT HI MDM: CPT

## 2024-03-20 PROCEDURE — 74177 CT ABD & PELVIS W/CONTRAST: CPT | Mod: 26,MC

## 2024-03-20 RX ORDER — ACETAMINOPHEN 500 MG
1000 TABLET ORAL ONCE
Refills: 0 | Status: COMPLETED | OUTPATIENT
Start: 2024-03-20 | End: 2024-03-20

## 2024-03-20 RX ORDER — ONDANSETRON 8 MG/1
4 TABLET, FILM COATED ORAL ONCE
Refills: 0 | Status: COMPLETED | OUTPATIENT
Start: 2024-03-20 | End: 2024-03-20

## 2024-03-20 RX ORDER — SODIUM CHLORIDE 9 MG/ML
1000 INJECTION, SOLUTION INTRAVENOUS ONCE
Refills: 0 | Status: COMPLETED | OUTPATIENT
Start: 2024-03-20 | End: 2024-03-20

## 2024-03-20 RX ADMIN — SODIUM CHLORIDE 1000 MILLILITER(S): 9 INJECTION, SOLUTION INTRAVENOUS at 21:41

## 2024-03-20 RX ADMIN — Medication 1000 MILLIGRAM(S): at 22:32

## 2024-03-20 RX ADMIN — ONDANSETRON 4 MILLIGRAM(S): 8 TABLET, FILM COATED ORAL at 21:41

## 2024-03-20 RX ADMIN — SODIUM CHLORIDE 1000 MILLILITER(S): 9 INJECTION, SOLUTION INTRAVENOUS at 22:32

## 2024-03-20 RX ADMIN — Medication 400 MILLIGRAM(S): at 21:41

## 2024-03-20 NOTE — ED PROVIDER NOTE - PATIENT PORTAL LINK FT
You can access the FollowMyHealth Patient Portal offered by [facility] by registering at the following website: [email or web address]. By joining [employer]’s FollowMyHealth portal, you will also be able to view your health information using other applications (apps) compatible with our system.

## 2024-03-20 NOTE — ED ADULT NURSE NOTE - NSFALLUNIVINTERV_ED_ALL_ED
Bed/Stretcher in lowest position, wheels locked, appropriate side rails in place/Call bell, personal items and telephone in reach/Instruct patient to call for assistance before getting out of bed/chair/stretcher/Non-slip footwear applied when patient is off stretcher/Holiday to call system/Physically safe environment - no spills, clutter or unnecessary equipment/Purposeful proactive rounding/Room/bathroom lighting operational, light cord in reach

## 2024-03-20 NOTE — ED PROVIDER NOTE - PHYSICAL EXAMINATION
General: uncomfortable; alert, oriented to person, time, place  Psych: mood appropriate  Head: normocephalic; atraumatic  Eyes: conjunctivae clear bilaterally, sclerae anicteric  ENT: no nasal flaring, patent nares  Cardio: RRR, no m/r/g, pulses 2+ b/l  Resp: CATB, no w/r/r  GI: soft/nondistended/nontender  Neuro: normal sensation, moving all four extremities equally  Skin: No evidence of rash or bruising  MSK: normal movement of all extremities  Lymph/Vasc: no LE edema

## 2024-03-20 NOTE — ED ADULT TRIAGE NOTE - CHIEF COMPLAINT QUOTE
recent travel to [facility], nausea vomiting and abdominal cramping. denies diarrhea

## 2024-03-20 NOTE — ED ADULT NURSE NOTE - OBJECTIVE STATEMENT
33Y F AXO3 no pertinent PMH and PSH presented to the ED c/o R and L lower abdominal quadrant pain x 3 days. Pt reports new onset of nausea, several episodes of vomiting, and loose stools since today. Pt reports recent travel to "urban area in [facility] returning Monday." Pt endorses bloating and shaking episodes. Upon arrival to the ED, the pt is well appearing, has bilateral even and unlabored chest rise, and ambulatory with steady gait. Upon assessment, pt has even and bilateral peripheral pulses, ROM, PERRLA, gross neuro intact, and soft, non-distended abdomen. Tenderness noted upon palpation of Lower abdominal quadrants. Pt denies fevers, chest pain, SOB, headaches, dizziness, numbness or tingling of extremities, lightheadedness, urinary symptoms, and black or bloody stools. IV access obtained, bed in lowest position, side rails up. Pt's mother at bedside.

## 2024-03-20 NOTE — ED PROVIDER NOTE - ATTENDING CONTRIBUTION TO CARE
Attending MD Rojas:  I have seen and examined this patient and fully participated in the care of this patient as the teaching attending. I personally made/approved the management plan and take responsibility for the patient management.

## 2024-03-20 NOTE — ED PROVIDER NOTE - PROGRESS NOTE DETAILS
Attending Jeovany Walker:  Patient signed out to me, hemodynam stable, here for travel related diarrheal illness, inability to tolerate PO. Steady gait. CTs largely nonactionable. No RUQ ttp. Do not think acute clay. Elevated AG in setting of lactate, from 8 -> 10 -> 8, Do not think ischemia. Plan for abxs, continued hydration. Javy Stephens MD: CT scan shows no acute bowel pathology, distended gallbladder but patient has no right upper quadrant pain and is [place] negative.  Lactate has trended down to 8.8.  Patient now tolerating p.o. intake.  Will obtain repeat lactate now that fluids have finished, check anion gap on repeat metabolic panel. Javy Stephens MD: Repeat metabolic panel shows improved anion gap, lactate down trended to 5.4.  Patient still tolerating p.o. intake.  Will discharge with prescription for metoclopramide and ciprofloxacin.  Strict return precautions given.

## 2024-03-20 NOTE — ED ADULT NURSE REASSESSMENT NOTE - NS ED NURSE REASSESS COMMENT FT1
pt states she just vomiting and felt like she was going to pass out, VS retaken and stable. Pt ambulated independently with steady gait, well appearing

## 2024-03-20 NOTE — ED PROVIDER NOTE - NSFOLLOWUPINSTRUCTIONS_ED_ALL_ED_FT
You were seen in the emergency department for abdominal pain with vomiting and diarrhea. We have evaluated you and determined that you do not require further hospital interventions.    During your stay you had the following relevant results: A CT scan that did not show evidence of infection or obstruction of your intestines, lab work that showed an elevated lactate which may reflect severe dehydration    Please follow up with your primary doctor to discuss the results of your stay in our department.    You are being discharged with a prescription for an antinausea medication called metoclopramide and an antibiotic called ciprofloxacin.  Please take both medications as instructed.    If you start to experience worsening symptoms such as persistent fevers or chills, inability to tolerate food intake, severe abdominal pain, please return to the emergency department for further evaluation.

## 2024-03-20 NOTE — ED PROVIDER NOTE - CLINICAL SUMMARY MEDICAL DECISION MAKING FREE TEXT BOX
Attending MD Rojas: 33-year-old woman is presenting for evaluation of lower abdominal discomfort nausea vomiting and diarrhea.  Patient states her symptoms started 3 days prior with intermittent bandlike lower abdominal pain.  No radiation to flank or back.  Denied any fevers at that time.  Today she developed nausea and vomiting as well as loose stools in the waiting room.  Nonbloody.  She is now having shaking.  Denies any urinary symptoms no vaginal bleeding or discharge.  Last menses was in December however she states she has irregular periods due to OCP use.  Patient states she just returned from [place], she was in an urban area.    Patient's vital signs are within normal limits.  Patient is sitting in the stretcher uncomfortable appearing shaking, the abdomen is soft nontender nondistended.  Extremities warm and well-perfused clear lungs anteriorly regular heart sounds.  At the end of the examination patient became very nauseous and started vomiting into emesis bag.    Differential diagnosis includes but is not fully limited to infectious colitis, diverticulitis, less likely appendicitis as patient has no focal tenderness to palpation.  I also consider  pathology such as ovarian pathology but find this less likely given presence of diarrhea.  Will obtain screening labs, GI PCR IV fluids antiemetics CT abdomen pelvis and reassess

## 2024-03-20 NOTE — ED ADULT NURSE NOTE - NSHOSCREENINGQ1_ED_ALL_ED
18/M WALKED IN C/O SOB ONSET 2 DAYS. PT REPORTS HAVING COUGH AND CONGESTION 
ONSET 1WK THAT GOT BETTER. AFEBRILE AT TRIAGE. AAO4, AMBULATORY, VITALS STABLE, 
NO ACUTE DISTRESS NOTED.



PMH: DENIES No

## 2024-03-20 NOTE — ED PROVIDER NOTE - OBJECTIVE STATEMENT
33-year-old female with no pertinent past medical history presents emergency department due to abdominal pain associated with nausea, vomiting, diarrhea.  This started 3 days ago.  She has no measured fevers or subjective chills.  She recently traveled to the country of [facility].

## 2024-03-21 VITALS
SYSTOLIC BLOOD PRESSURE: 125 MMHG | OXYGEN SATURATION: 97 % | DIASTOLIC BLOOD PRESSURE: 85 MMHG | RESPIRATION RATE: 17 BRPM | TEMPERATURE: 98 F | HEART RATE: 85 BPM

## 2024-03-21 LAB
ANION GAP SERPL CALC-SCNC: 19 MMOL/L — HIGH (ref 5–17)
ANION GAP SERPL CALC-SCNC: 23 MMOL/L — HIGH (ref 5–17)
APPEARANCE UR: CLEAR — SIGNIFICANT CHANGE UP
BILIRUB UR-MCNC: NEGATIVE — SIGNIFICANT CHANGE UP
BUN SERPL-MCNC: 7 MG/DL — SIGNIFICANT CHANGE UP (ref 7–23)
BUN SERPL-MCNC: 9 MG/DL — SIGNIFICANT CHANGE UP (ref 7–23)
CALCIUM SERPL-MCNC: 8.1 MG/DL — LOW (ref 8.4–10.5)
CALCIUM SERPL-MCNC: 8.4 MG/DL — SIGNIFICANT CHANGE UP (ref 8.4–10.5)
CHLORIDE SERPL-SCNC: 93 MMOL/L — LOW (ref 96–108)
CHLORIDE SERPL-SCNC: 96 MMOL/L — SIGNIFICANT CHANGE UP (ref 96–108)
CO2 SERPL-SCNC: 22 MMOL/L — SIGNIFICANT CHANGE UP (ref 22–31)
CO2 SERPL-SCNC: 24 MMOL/L — SIGNIFICANT CHANGE UP (ref 22–31)
COLOR SPEC: YELLOW — SIGNIFICANT CHANGE UP
CREAT SERPL-MCNC: 0.64 MG/DL — SIGNIFICANT CHANGE UP (ref 0.5–1.3)
CREAT SERPL-MCNC: 0.65 MG/DL — SIGNIFICANT CHANGE UP (ref 0.5–1.3)
DIFF PNL FLD: NEGATIVE — SIGNIFICANT CHANGE UP
EGFR: 118 ML/MIN/1.73M2 — SIGNIFICANT CHANGE UP
EGFR: 119 ML/MIN/1.73M2 — SIGNIFICANT CHANGE UP
GLUCOSE SERPL-MCNC: 169 MG/DL — HIGH (ref 70–99)
GLUCOSE SERPL-MCNC: 200 MG/DL — HIGH (ref 70–99)
GLUCOSE UR QL: 100 MG/DL
KETONES UR-MCNC: ABNORMAL MG/DL
LACTATE BLDV-MCNC: 5.4 MMOL/L — CRITICAL HIGH (ref 0.5–2)
LACTATE BLDV-MCNC: 8.8 MMOL/L — CRITICAL HIGH (ref 0.5–2)
LEUKOCYTE ESTERASE UR-ACNC: NEGATIVE — SIGNIFICANT CHANGE UP
NITRITE UR-MCNC: NEGATIVE — SIGNIFICANT CHANGE UP
PH UR: 6.5 — SIGNIFICANT CHANGE UP (ref 5–8)
POTASSIUM SERPL-MCNC: 3.6 MMOL/L — SIGNIFICANT CHANGE UP (ref 3.5–5.3)
POTASSIUM SERPL-MCNC: 3.7 MMOL/L — SIGNIFICANT CHANGE UP (ref 3.5–5.3)
POTASSIUM SERPL-SCNC: 3.6 MMOL/L — SIGNIFICANT CHANGE UP (ref 3.5–5.3)
POTASSIUM SERPL-SCNC: 3.7 MMOL/L — SIGNIFICANT CHANGE UP (ref 3.5–5.3)
PROT UR-MCNC: NEGATIVE MG/DL — SIGNIFICANT CHANGE UP
SODIUM SERPL-SCNC: 138 MMOL/L — SIGNIFICANT CHANGE UP (ref 135–145)
SODIUM SERPL-SCNC: 139 MMOL/L — SIGNIFICANT CHANGE UP (ref 135–145)
SP GR SPEC: >1.03 — HIGH (ref 1–1.03)
UROBILINOGEN FLD QL: 0.2 MG/DL — SIGNIFICANT CHANGE UP (ref 0.2–1)

## 2024-03-21 PROCEDURE — 83605 ASSAY OF LACTIC ACID: CPT

## 2024-03-21 PROCEDURE — 74177 CT ABD & PELVIS W/CONTRAST: CPT | Mod: MC

## 2024-03-21 PROCEDURE — 82803 BLOOD GASES ANY COMBINATION: CPT

## 2024-03-21 PROCEDURE — 84132 ASSAY OF SERUM POTASSIUM: CPT

## 2024-03-21 PROCEDURE — 99285 EMERGENCY DEPT VISIT HI MDM: CPT | Mod: 25

## 2024-03-21 PROCEDURE — 87086 URINE CULTURE/COLONY COUNT: CPT

## 2024-03-21 PROCEDURE — 83690 ASSAY OF LIPASE: CPT

## 2024-03-21 PROCEDURE — 82947 ASSAY GLUCOSE BLOOD QUANT: CPT

## 2024-03-21 PROCEDURE — 96365 THER/PROPH/DIAG IV INF INIT: CPT | Mod: XU

## 2024-03-21 PROCEDURE — 85025 COMPLETE CBC W/AUTO DIFF WBC: CPT

## 2024-03-21 PROCEDURE — 85730 THROMBOPLASTIN TIME PARTIAL: CPT

## 2024-03-21 PROCEDURE — 36415 COLL VENOUS BLD VENIPUNCTURE: CPT

## 2024-03-21 PROCEDURE — 82330 ASSAY OF CALCIUM: CPT

## 2024-03-21 PROCEDURE — 93005 ELECTROCARDIOGRAM TRACING: CPT

## 2024-03-21 PROCEDURE — 85014 HEMATOCRIT: CPT

## 2024-03-21 PROCEDURE — 81003 URINALYSIS AUTO W/O SCOPE: CPT

## 2024-03-21 PROCEDURE — 84295 ASSAY OF SERUM SODIUM: CPT

## 2024-03-21 PROCEDURE — 80053 COMPREHEN METABOLIC PANEL: CPT

## 2024-03-21 PROCEDURE — 85018 HEMOGLOBIN: CPT

## 2024-03-21 PROCEDURE — 96375 TX/PRO/DX INJ NEW DRUG ADDON: CPT

## 2024-03-21 PROCEDURE — 85610 PROTHROMBIN TIME: CPT

## 2024-03-21 PROCEDURE — 82435 ASSAY OF BLOOD CHLORIDE: CPT

## 2024-03-21 PROCEDURE — 80048 BASIC METABOLIC PNL TOTAL CA: CPT

## 2024-03-21 PROCEDURE — 84702 CHORIONIC GONADOTROPIN TEST: CPT

## 2024-03-21 RX ORDER — PIPERACILLIN AND TAZOBACTAM 4; .5 G/20ML; G/20ML
3.38 INJECTION, POWDER, LYOPHILIZED, FOR SOLUTION INTRAVENOUS ONCE
Refills: 0 | Status: COMPLETED | OUTPATIENT
Start: 2024-03-21 | End: 2024-03-21

## 2024-03-21 RX ORDER — METOCLOPRAMIDE HCL 10 MG
10 TABLET ORAL ONCE
Refills: 0 | Status: COMPLETED | OUTPATIENT
Start: 2024-03-21 | End: 2024-03-21

## 2024-03-21 RX ORDER — MAGNESIUM SULFATE 500 MG/ML
1 VIAL (ML) INJECTION ONCE
Refills: 0 | Status: COMPLETED | OUTPATIENT
Start: 2024-03-21 | End: 2024-03-21

## 2024-03-21 RX ORDER — CIPROFLOXACIN LACTATE 400MG/40ML
1 VIAL (ML) INTRAVENOUS
Qty: 14 | Refills: 0
Start: 2024-03-21 | End: 2024-03-27

## 2024-03-21 RX ORDER — LIDOCAINE 4 G/100G
20 CREAM TOPICAL ONCE
Refills: 0 | Status: COMPLETED | OUTPATIENT
Start: 2024-03-21 | End: 2024-03-21

## 2024-03-21 RX ORDER — SODIUM CHLORIDE 9 MG/ML
1000 INJECTION INTRAMUSCULAR; INTRAVENOUS; SUBCUTANEOUS ONCE
Refills: 0 | Status: COMPLETED | OUTPATIENT
Start: 2024-03-21 | End: 2024-03-21

## 2024-03-21 RX ORDER — METOCLOPRAMIDE HCL 10 MG
1 TABLET ORAL
Qty: 2 | Refills: 0
Start: 2024-03-21

## 2024-03-21 RX ADMIN — LIDOCAINE 20 MILLILITER(S): 4 CREAM TOPICAL at 03:03

## 2024-03-21 RX ADMIN — PIPERACILLIN AND TAZOBACTAM 200 GRAM(S): 4; .5 INJECTION, POWDER, LYOPHILIZED, FOR SOLUTION INTRAVENOUS at 03:01

## 2024-03-21 RX ADMIN — SODIUM CHLORIDE 1000 MILLILITER(S): 9 INJECTION, SOLUTION INTRAVENOUS at 00:02

## 2024-03-21 RX ADMIN — SODIUM CHLORIDE 1000 MILLILITER(S): 9 INJECTION INTRAMUSCULAR; INTRAVENOUS; SUBCUTANEOUS at 01:36

## 2024-03-21 RX ADMIN — Medication 100 GRAM(S): at 03:31

## 2024-03-21 RX ADMIN — ONDANSETRON 4 MILLIGRAM(S): 8 TABLET, FILM COATED ORAL at 00:02

## 2024-03-21 RX ADMIN — Medication 10 MILLIGRAM(S): at 02:26

## 2024-03-22 LAB
CULTURE RESULTS: SIGNIFICANT CHANGE UP
SPECIMEN SOURCE: SIGNIFICANT CHANGE UP

## 2024-03-22 RX ORDER — METOCLOPRAMIDE HCL 10 MG
1 TABLET ORAL
Qty: 2 | Refills: 0
Start: 2024-03-22 | End: 2024-03-22

## 2024-03-22 NOTE — ED POST DISCHARGE NOTE - ADDITIONAL DOCUMENTATION
Pt called advising that her pharmacy does not have disintegrating Reglan, requeting rx be switched to tablets. Resent rx as previously written in tablet form - Debora Simon PA-C

## 2024-03-27 ENCOUNTER — APPOINTMENT (OUTPATIENT)
Dept: INTERNAL MEDICINE | Facility: CLINIC | Age: 34
End: 2024-03-27
Payer: MEDICAID

## 2024-03-27 ENCOUNTER — LABORATORY RESULT (OUTPATIENT)
Age: 34
End: 2024-03-27

## 2024-03-27 VITALS
OXYGEN SATURATION: 78 % | HEART RATE: 72 BPM | TEMPERATURE: 98.6 F | SYSTOLIC BLOOD PRESSURE: 102 MMHG | DIASTOLIC BLOOD PRESSURE: 80 MMHG | HEIGHT: 62 IN

## 2024-03-27 DIAGNOSIS — J45.20 MILD INTERMITTENT ASTHMA, UNCOMPLICATED: ICD-10-CM

## 2024-03-27 DIAGNOSIS — F41.9 ANXIETY DISORDER, UNSPECIFIED: ICD-10-CM

## 2024-03-27 DIAGNOSIS — Z00.00 ENCOUNTER FOR GENERAL ADULT MEDICAL EXAMINATION W/OUT ABNORMAL FINDINGS: ICD-10-CM

## 2024-03-27 PROCEDURE — 99395 PREV VISIT EST AGE 18-39: CPT | Mod: 25

## 2024-03-27 RX ORDER — ALBUTEROL SULFATE 90 UG/1
108 (90 BASE) INHALANT RESPIRATORY (INHALATION)
Qty: 1 | Refills: 2 | Status: ACTIVE | COMMUNITY
Start: 2023-01-18 | End: 1900-01-01

## 2024-03-29 NOTE — HISTORY OF PRESENT ILLNESS
[FreeTextEntry1] : CPE [de-identified] : Ms. SHAW is a 33 year old female that presents to the office for a CPE.  Last week had 3-4 days of diarrhea, vomiting - went to ED Pt was very dehydrated - received IV and abx, magnesium Pt was travelling for 3 weeks prior to this to [place] - returned home March 11th CT abd - Distended gallbladder without additional findings. No acute bowel pathology. Hepatic steatosis and hepatomegaly D/C on oral abx - Cipro - will finish course tomorrow Currently concerned about dark stools- very dark green but no current abd. pain - pt 'feels fine' Pt started new vitamin - a black pill- liposomal glutathione Also started iron tablet - likely source of dark stools Now tolerating PO  Labs today GI referral provided   PMHx: Asthma, anxiety Gyn annual Jan 2024 On OCP STI panel performed, pt not currently sexually active- all negative Tolerating medication well, no recent changes  Uses albuterol inhaler  Seeing ? 102/80  -Flu - UTD -COVID19 - Primary series x2, 1 booster -TDAP/MMR - 2022 -PAP smear - Jan 2024

## 2024-03-29 NOTE — HEALTH RISK ASSESSMENT
[Good] : ~his/her~  mood as  good [Yes] : Yes [No] : In the past 12 months have you used drugs other than those required for medical reasons? No [No falls in past year] : Patient reported no falls in the past year [Patient reported PAP Smear was normal] : Patient reported PAP Smear was normal [With Family] : lives with family [Unemployed] : unemployed [College] : College [Single] : single [# Of Children ___] : has [unfilled] children [Sexually Active] : sexually active [Feels Safe at Home] : Feels safe at home [Patient/Caregiver not ready to engage] : , patient/caregiver not ready to engage [0] : 2) Feeling down, depressed, or hopeless: Not at all (0) [PHQ-2 Negative - No further assessment needed] : PHQ-2 Negative - No further assessment needed [Fully functional (bathing, dressing, toileting, transferring, walking, feeding)] : Fully functional (bathing, dressing, toileting, transferring, walking, feeding) [Fully functional (using the telephone, shopping, preparing meals, housekeeping, doing laundry, using] : Fully functional and needs no help or supervision to perform IADLs (using the telephone, shopping, preparing meals, housekeeping, doing laundry, using transportation, managing medications and managing finances) [de-identified] : Previously consuming 1 bottle wine per day or equivalent, now zero ETOH use [FreeTextEntry1] : Anxiety, panic attack [de-identified] : Behavioral health walk in clinic [de-identified] : Gym, walking [de-identified] : Regular, 3 meals/day [de-identified] : Patient following with behavioral health, new referral provided today [[id number]] : 0 [Language] : denies difficulty with language [Change in mental status noted] : No change in mental status noted [High Risk Behavior] : no high risk behavior [Reports changes in hearing] : Reports no changes in hearing [Reports changes in vision] : Reports no changes in vision [Reports changes in dental health] : Reports no changes in dental health [PapSmearComments] : Jan 2024 [FreeTextEntry2] :  [AdvancecareDate] : 01/18

## 2024-05-05 LAB
25(OH)D3 SERPL-MCNC: 39.1 NG/ML
ALBUMIN SERPL ELPH-MCNC: 4 G/DL
ALP BLD-CCNC: 85 U/L
ALT SERPL-CCNC: 80 U/L
AMYLASE/CREAT SERPL: 46 U/L
ANION GAP SERPL CALC-SCNC: 14 MMOL/L
APPEARANCE: CLEAR
AST SERPL-CCNC: 40 U/L
BACTERIA: NEGATIVE /HPF
BILIRUB DIRECT SERPL-MCNC: 0.1 MG/DL
BILIRUB INDIRECT SERPL-MCNC: 0.2 MG/DL
BILIRUB SERPL-MCNC: 0.3 MG/DL
BILIRUBIN URINE: NEGATIVE
BLOOD URINE: NEGATIVE
BUN SERPL-MCNC: 26 MG/DL
CALCIUM SERPL-MCNC: 9.1 MG/DL
CAST: 1 /LPF
CHLORIDE SERPL-SCNC: 104 MMOL/L
CHOLEST SERPL-MCNC: 180 MG/DL
CO2 SERPL-SCNC: 20 MMOL/L
COLOR: YELLOW
CREAT SERPL-MCNC: 2.67 MG/DL
EGFR: 23 ML/MIN/1.73M2
EPITHELIAL CELLS: 2 /HPF
ESTIMATED AVERAGE GLUCOSE: 131 MG/DL
FERRITIN SERPL-MCNC: 424 NG/ML
FOLATE SERPL-MCNC: >20 NG/ML
GLUCOSE QUALITATIVE U: NEGATIVE MG/DL
GLUCOSE SERPL-MCNC: 125 MG/DL
HBA1C MFR BLD HPLC: 6.2 %
HCT VFR BLD CALC: 35.5 %
HDLC SERPL-MCNC: 58 MG/DL
HGB BLD-MCNC: 11.6 G/DL
IRON SATN MFR SERPL: 18 %
IRON SERPL-MCNC: 61 UG/DL
KETONES URINE: NEGATIVE MG/DL
LDLC SERPL CALC-MCNC: 90 MG/DL
LEUKOCYTE ESTERASE URINE: NEGATIVE
LPL SERPL-CCNC: 9 U/L
MCHC RBC-ENTMCNC: 31.3 PG
MCHC RBC-ENTMCNC: 32.7 GM/DL
MCV RBC AUTO: 95.7 FL
MICROSCOPIC-UA: NORMAL
NITRITE URINE: NEGATIVE
NONHDLC SERPL-MCNC: 122 MG/DL
PH URINE: 6
PLATELET # BLD AUTO: 250 K/UL
POTASSIUM SERPL-SCNC: 4 MMOL/L
PROT SERPL-MCNC: 6.5 G/DL
PROTEIN URINE: NEGATIVE MG/DL
RBC # BLD: 3.71 M/UL
RBC # FLD: 13.5 %
RED BLOOD CELLS URINE: 0 /HPF
SODIUM SERPL-SCNC: 138 MMOL/L
SPECIFIC GRAVITY URINE: 1.01
T4 SERPL-MCNC: 6.7 UG/DL
TIBC SERPL-MCNC: 346 UG/DL
TRIGL SERPL-MCNC: 191 MG/DL
TSH SERPL-ACNC: 2.3 UIU/ML
UIBC SERPL-MCNC: 284 UG/DL
UROBILINOGEN URINE: 0.2 MG/DL
VIT B12 SERPL-MCNC: 472 PG/ML
WBC # FLD AUTO: 4.99 K/UL
WHITE BLOOD CELLS URINE: 1 /HPF

## 2024-05-21 ENCOUNTER — APPOINTMENT (OUTPATIENT)
Dept: GASTROENTEROLOGY | Facility: CLINIC | Age: 34
End: 2024-05-21
Payer: MEDICAID

## 2024-05-21 ENCOUNTER — APPOINTMENT (OUTPATIENT)
Dept: INTERNAL MEDICINE | Facility: CLINIC | Age: 34
End: 2024-05-21
Payer: MEDICAID

## 2024-05-21 ENCOUNTER — APPOINTMENT (OUTPATIENT)
Dept: INTERNAL MEDICINE | Facility: CLINIC | Age: 34
End: 2024-05-21

## 2024-05-21 ENCOUNTER — RX RENEWAL (OUTPATIENT)
Age: 34
End: 2024-05-21

## 2024-05-21 VITALS
OXYGEN SATURATION: 85 % | HEART RATE: 73 BPM | SYSTOLIC BLOOD PRESSURE: 125 MMHG | HEIGHT: 62 IN | WEIGHT: 126 LBS | BODY MASS INDEX: 23.19 KG/M2 | DIASTOLIC BLOOD PRESSURE: 78 MMHG

## 2024-05-21 DIAGNOSIS — K86.1 OTHER CHRONIC PANCREATITIS: ICD-10-CM

## 2024-05-21 DIAGNOSIS — R79.89 OTHER SPECIFIED ABNORMAL FINDINGS OF BLOOD CHEMISTRY: ICD-10-CM

## 2024-05-21 PROCEDURE — 36415 COLL VENOUS BLD VENIPUNCTURE: CPT

## 2024-05-21 PROCEDURE — 99203 OFFICE O/P NEW LOW 30 MIN: CPT

## 2024-05-21 RX ORDER — HYDROXYZINE HYDROCHLORIDE 25 MG/1
25 TABLET ORAL
Qty: 60 | Refills: 2 | Status: ACTIVE | COMMUNITY
Start: 2023-02-14 | End: 1900-01-01

## 2024-05-21 NOTE — PHYSICAL EXAM
[Alert] : alert [No Acute Distress] : no acute distress [Sclera] : the sclera and conjunctiva were normal [Normal Appearance] : the appearance of the neck was normal [No Respiratory Distress] : no respiratory distress [Auscultation Breath Sounds / Voice Sounds] : lungs were clear to auscultation bilaterally [Normal S1, S2] : normal S1 and S2 [Bowel Sounds] : normal bowel sounds [Abdomen Tenderness] : non-tender [Abnormal Walk] : normal gait [Normal Color / Pigmentation] : normal skin color and pigmentation [No Focal Deficits] : no focal deficits [Normal Mood] : the mood was normal

## 2024-05-22 LAB
ALBUMIN SERPL ELPH-MCNC: 4.4 G/DL
ALP BLD-CCNC: 82 U/L
ALT SERPL-CCNC: 26 U/L
ANION GAP SERPL CALC-SCNC: 13 MMOL/L
APPEARANCE: CLEAR
AST SERPL-CCNC: 18 U/L
BACTERIA: NEGATIVE /HPF
BILIRUB SERPL-MCNC: 0.4 MG/DL
BILIRUBIN URINE: NEGATIVE
BLOOD URINE: NEGATIVE
BUN SERPL-MCNC: 13 MG/DL
CALCIUM SERPL-MCNC: 9.6 MG/DL
CAST: 0 /LPF
CHLORIDE SERPL-SCNC: 102 MMOL/L
CO2 SERPL-SCNC: 21 MMOL/L
COLOR: YELLOW
CREAT SERPL-MCNC: 0.64 MG/DL
EGFR: 119 ML/MIN/1.73M2
EPITHELIAL CELLS: 18 /HPF
GLUCOSE QUALITATIVE U: NEGATIVE MG/DL
GLUCOSE SERPL-MCNC: 85 MG/DL
KETONES URINE: NEGATIVE MG/DL
LEUKOCYTE ESTERASE URINE: ABNORMAL
MICROSCOPIC-UA: NORMAL
NITRITE URINE: NEGATIVE
PH URINE: 6.5
POTASSIUM SERPL-SCNC: 3.8 MMOL/L
PROT SERPL-MCNC: 7 G/DL
PROTEIN URINE: NEGATIVE MG/DL
RED BLOOD CELLS URINE: 0 /HPF
SODIUM SERPL-SCNC: 136 MMOL/L
SPECIFIC GRAVITY URINE: 1.01
UROBILINOGEN URINE: 0.2 MG/DL
WHITE BLOOD CELLS URINE: 7 /HPF

## 2024-05-23 ENCOUNTER — APPOINTMENT (OUTPATIENT)
Dept: INTERNAL MEDICINE | Facility: CLINIC | Age: 34
End: 2024-05-23
Payer: MEDICAID

## 2024-05-23 DIAGNOSIS — R16.0 HEPATOMEGALY, NOT ELSEWHERE CLASSIFIED: ICD-10-CM

## 2024-06-02 ENCOUNTER — NON-APPOINTMENT (OUTPATIENT)
Age: 34
End: 2024-06-02

## 2024-06-02 PROBLEM — K86.1 CHRONIC CALCIFIC PANCREATITIS: Status: ACTIVE | Noted: 2024-06-02

## 2024-06-02 NOTE — HISTORY OF PRESENT ILLNESS
[FreeTextEntry1] : Referred for chronic calcific pancreatitis on CT  Had acute pancreatitis of unclear etiology around 2017 or 2018 in [place].  Had acute abd pain/altered bowel habits recently, saw ED, labs ok, CT notable for distended GB, atrophic pancreas with calcifications, and mildly distended small bowel (images personally reviewed)  Prior at least 5 alcohol containing drinks / week x 5 years related to career. Consumed alcohol in college as well.  No recurrent fever, chronic abd pain, n/v, chronic diarrhea, jaundice.  Had 10 lb weight loss over 1 year.  No family history of pancreas problems.

## 2024-06-02 NOTE — ASSESSMENT
[FreeTextEntry1] : Impression:  #1. Chronic calcific pancreatitis.  Significant past alcohol intake may have contributed. Pt educated regarding etiologies, natural history, symptoms of pancreatitis.  Plan:  #1,  Ultrasound GB  #2.  MRI/MRCP abdomen  #3.  Minimize alcohol intake  #4.  Followup 6 months.

## 2024-06-02 NOTE — REVIEW OF SYSTEMS
[Fever] : no fever [Chills] : no chills [Chest Pain] : no chest pain [Shortness Of Breath] : no shortness of breath [FreeTextEntry2] : Had traveled to [facility] in March 2024 . [FreeTextEntry9] : Reports she exercises  [FreeTextEntry7] : ER visit March 2023 , nausea/vomiting and diarrhea has resolved after antibiotics x 7 days and hydration

## 2024-06-02 NOTE — CONSULT LETTER
[Dear  ___] : Dear  [unfilled], [Consult Letter:] : I had the pleasure of evaluating your patient, [unfilled]. [Please see my note below.] : Please see my note below. [Consult Closing:] : Thank you very much for allowing me to participate in the care of this patient.  If you have any questions, please do not hesitate to contact me. [Sincerely,] : Sincerely, [FreeTextEntry3] : Devon Kimbrough MD, MPH, FABIOLA, JEANETTEG Chief of Clinical Quality in Gastroenterology, [facility] Associate Chief of Gastroenterology, [facility] Interim Director of Endoscopy, [address], [address] [facility], [id number] 24 hours [phone]

## 2024-08-21 ENCOUNTER — APPOINTMENT (OUTPATIENT)
Dept: INTERNAL MEDICINE | Facility: CLINIC | Age: 34
End: 2024-08-21

## 2024-08-21 VITALS
TEMPERATURE: 98.2 F | DIASTOLIC BLOOD PRESSURE: 82 MMHG | BODY MASS INDEX: 21.42 KG/M2 | HEART RATE: 89 BPM | OXYGEN SATURATION: 98 % | WEIGHT: 116.4 LBS | SYSTOLIC BLOOD PRESSURE: 118 MMHG | HEIGHT: 62 IN

## 2024-08-21 DIAGNOSIS — J45.20 MILD INTERMITTENT ASTHMA, UNCOMPLICATED: ICD-10-CM

## 2024-08-21 DIAGNOSIS — Z02.1 ENCOUNTER FOR PRE-EMPLOYMENT EXAMINATION: ICD-10-CM

## 2024-08-21 PROCEDURE — 99215 OFFICE O/P EST HI 40 MIN: CPT

## 2024-08-21 NOTE — HISTORY OF PRESENT ILLNESS
[FreeTextEntry1] : Follow up [de-identified] : Ms. SHAW is a 33 year old female who presents to the office for follow up: Pt reports some abnormal periods - recently changed birth control. Pt will be seeing GYN upcoming.  PMHx: Asthma, anxiety Continues on oral iron, MV, collagen, ailin Pt is working out more regularly, has lost about 10 pounds Working out in AM daily - about 30 minutes Hydrating well  125/78 116lb  Last A1C 6.2%  CPE March 2025

## 2024-08-21 NOTE — PHYSICAL EXAM
[No Acute Distress] : no acute distress [Well Nourished] : well nourished [Well Developed] : well developed [Well-Appearing] : well-appearing [Normal Sclera/Conjunctiva] : normal sclera/conjunctiva [PERRL] : pupils equal round and reactive to light [EOMI] : extraocular movements intact [Normal Outer Ear/Nose] : the outer ears and nose were normal in appearance [Normal Oropharynx] : the oropharynx was normal [No JVD] : no jugular venous distention [No Lymphadenopathy] : no lymphadenopathy [Supple] : supple [Thyroid Normal, No Nodules] : the thyroid was normal and there were no nodules present [No Respiratory Distress] : no respiratory distress  [No Accessory Muscle Use] : no accessory muscle use [Clear to Auscultation] : lungs were clear to auscultation bilaterally [Normal Rate] : normal rate  [Regular Rhythm] : with a regular rhythm [Normal S1, S2] : normal S1 and S2 [No Carotid Bruits] : no carotid bruits [No Abdominal Bruit] : a ~M bruit was not heard ~T in the abdomen [Pedal Pulses Present] : the pedal pulses are present [No Palpable Aorta] : no palpable aorta [No Extremity Clubbing/Cyanosis] : no extremity clubbing/cyanosis [Soft] : abdomen soft [Non Tender] : non-tender [Non-distended] : non-distended [No Masses] : no abdominal mass palpated [Normal Bowel Sounds] : normal bowel sounds [No HSM] : no HSM [Normal Posterior Cervical Nodes] : no posterior cervical lymphadenopathy [Normal Anterior Cervical Nodes] : no anterior cervical lymphadenopathy [No Spinal Tenderness] : no spinal tenderness [No CVA Tenderness] : no CVA  tenderness [No Joint Swelling] : no joint swelling [Grossly Normal Strength/Tone] : grossly normal strength/tone [No Rash] : no rash [Coordination Grossly Intact] : coordination grossly intact [No Focal Deficits] : no focal deficits [Normal Gait] : normal gait [Normal Affect] : the affect was normal [Normal Insight/Judgement] : insight and judgment were intact

## 2024-08-30 LAB
ALBUMIN SERPL ELPH-MCNC: 4.8 G/DL
ALP BLD-CCNC: 112 U/L
ALT SERPL-CCNC: 65 U/L
AMPHET UR-MCNC: NEGATIVE NG/ML
ANION GAP SERPL CALC-SCNC: 18 MMOL/L
AST SERPL-CCNC: 61 U/L
BARBITURATES UR-MCNC: NEGATIVE NG/ML
BENZODIAZ UR-MCNC: NEGATIVE NG/ML
BILIRUB SERPL-MCNC: 2.3 MG/DL
BUN SERPL-MCNC: 8 MG/DL
C TETANI IGG SER-ACNC: 4.3 IU/ML
CALCIUM SERPL-MCNC: 9.9 MG/DL
CHLORIDE SERPL-SCNC: 95 MMOL/L
CHOLEST SERPL-MCNC: 166 MG/DL
CO2 SERPL-SCNC: 26 MMOL/L
COCAINE METAB.OTHER UR-MCNC: NEGATIVE NG/ML
CREAT SERPL-MCNC: 0.7 MG/DL
CREATININE, URINE: 206.7 MG/DL
EGFR: 116 ML/MIN/1.73M2
ESTIMATED AVERAGE GLUCOSE: 128 MG/DL
FENTANYL, URINE: NEGATIVE NG/ML
GLUCOSE SERPL-MCNC: 156 MG/DL
HBA1C MFR BLD HPLC: 6.1 %
HBV SURFACE AB SERPL IA-ACNC: 29.8 MIU/ML
HCT VFR BLD CALC: 42.2 %
HDLC SERPL-MCNC: 98 MG/DL
HGB BLD-MCNC: 13.7 G/DL
LDLC SERPL CALC-MCNC: 43 MG/DL
M TB IFN-G BLD-IMP: NEGATIVE
MCHC RBC-ENTMCNC: 30.3 PG
MCHC RBC-ENTMCNC: 32.5 GM/DL
MCV RBC AUTO: 93.4 FL
METHADONE SCREEN, UR: NEGATIVE NG/ML
MEV IGG FLD QL IA: 17 AU/ML
MEV IGG+IGM SER-IMP: POSITIVE
MUV AB SER-ACNC: POSITIVE
MUV IGG SER QL IA: 82.7 AU/ML
NONHDLC SERPL-MCNC: 67 MG/DL
OPIATES UR-MCNC: NEGATIVE NG/ML
OXYCODONE/OXYMORPHONE, URINE: NEGATIVE NG/ML
PCP UR-MCNC: NEGATIVE NG/ML
PH, URINE: 6.1
PLATELET # BLD AUTO: 222 K/UL
POTASSIUM SERPL-SCNC: 4.3 MMOL/L
PROT SERPL-MCNC: 7.6 G/DL
QUANTIFERON TB PLUS MITOGEN MINUS NIL: >10 IU/ML
QUANTIFERON TB PLUS NIL: 0.05 IU/ML
QUANTIFERON TB PLUS TB1 MINUS NIL: 0.03 IU/ML
QUANTIFERON TB PLUS TB2 MINUS NIL: 0 IU/ML
RBC # BLD: 4.52 M/UL
RBC # FLD: 13.2 %
RUBV IGG FLD-ACNC: 4.8 INDEX
RUBV IGG SER-IMP: POSITIVE
SODIUM SERPL-SCNC: 139 MMOL/L
THC UR QL: NEGATIVE NG/ML
TRIGL SERPL-MCNC: 152 MG/DL
TSH SERPL-ACNC: 2.24 UIU/ML
VZV AB TITR SER: POSITIVE
VZV IGG SER IF-ACNC: 895.1 INDEX
WBC # FLD AUTO: 4.35 K/UL

## 2024-11-14 ENCOUNTER — NON-APPOINTMENT (OUTPATIENT)
Age: 34
End: 2024-11-14

## 2024-11-18 DIAGNOSIS — Z87.19 PERSONAL HISTORY OF OTHER DISEASES OF THE DIGESTIVE SYSTEM: ICD-10-CM

## 2024-12-17 ENCOUNTER — APPOINTMENT (OUTPATIENT)
Dept: GASTROENTEROLOGY | Facility: CLINIC | Age: 34
End: 2024-12-17